# Patient Record
Sex: MALE | Race: WHITE | Employment: FULL TIME | URBAN - METROPOLITAN AREA
[De-identification: names, ages, dates, MRNs, and addresses within clinical notes are randomized per-mention and may not be internally consistent; named-entity substitution may affect disease eponyms.]

---

## 2017-01-25 PROCEDURE — 84403 ASSAY OF TOTAL TESTOSTERONE: CPT | Performed by: UROLOGY

## 2018-08-10 ENCOUNTER — APPOINTMENT (OUTPATIENT)
Dept: LAB | Facility: HOSPITAL | Age: 58
End: 2018-08-10
Attending: UROLOGY
Payer: COMMERCIAL

## 2018-08-10 DIAGNOSIS — Z98.52 S/P VASECTOMY: ICD-10-CM

## 2018-08-10 PROCEDURE — 89310 SEMEN ANALYSIS W/COUNT: CPT

## 2021-03-20 ENCOUNTER — IMMUNIZATION (OUTPATIENT)
Dept: LAB | Age: 61
End: 2021-03-20
Attending: HOSPITALIST
Payer: COMMERCIAL

## 2021-03-20 DIAGNOSIS — Z23 NEED FOR VACCINATION: Primary | ICD-10-CM

## 2021-03-20 PROCEDURE — 0001A SARSCOV2 VAC 30MCG/0.3ML IM: CPT

## 2021-04-10 ENCOUNTER — IMMUNIZATION (OUTPATIENT)
Dept: LAB | Age: 61
End: 2021-04-10
Attending: HOSPITALIST
Payer: COMMERCIAL

## 2021-04-10 DIAGNOSIS — Z23 NEED FOR VACCINATION: Primary | ICD-10-CM

## 2021-04-10 PROCEDURE — 0002A SARSCOV2 VAC 30MCG/0.3ML IM: CPT

## 2022-02-14 PROBLEM — M75.101 ROTATOR CUFF SYNDROME OF RIGHT SHOULDER: Status: ACTIVE | Noted: 2022-02-14

## 2022-02-14 PROBLEM — M19.011 LOCALIZED OSTEOARTHRITIS OF RIGHT SHOULDER: Status: ACTIVE | Noted: 2022-02-14

## 2022-12-17 ENCOUNTER — TELEPHONE (OUTPATIENT)
Dept: ORTHOPEDICS CLINIC | Facility: CLINIC | Age: 62
End: 2022-12-17

## 2022-12-17 DIAGNOSIS — M54.50 LOW BACK PAIN, UNSPECIFIED BACK PAIN LATERALITY, UNSPECIFIED CHRONICITY, UNSPECIFIED WHETHER SCIATICA PRESENT: Primary | ICD-10-CM

## 2022-12-19 ENCOUNTER — PATIENT MESSAGE (OUTPATIENT)
Dept: ORTHOPEDICS CLINIC | Facility: CLINIC | Age: 62
End: 2022-12-19

## 2022-12-19 NOTE — TELEPHONE ENCOUNTER
Future Appointments   Date Time Provider Janis Jennifer   12/23/2022  7:00 AM Maribel Clarke MD [facility]   12/23/2022 10:00 AM [facility] XR RM1 [facility] [place]       Patient is scheduled for appt and LM to complete prior to appt.

## 2023-09-16 ENCOUNTER — HOSPITAL ENCOUNTER (EMERGENCY)
Facility: HOSPITAL | Age: 63
Discharge: HOME | End: 2023-09-16
Attending: EMERGENCY MEDICINE
Payer: COMMERCIAL

## 2023-09-16 ENCOUNTER — APPOINTMENT (OUTPATIENT)
Dept: CT IMAGING | Facility: HOSPITAL | Age: 63
End: 2023-09-16
Attending: EMERGENCY MEDICINE
Payer: COMMERCIAL

## 2023-09-16 VITALS
HEIGHT: 73 IN | DIASTOLIC BLOOD PRESSURE: 100 MMHG | OXYGEN SATURATION: 96 % | WEIGHT: 220 LBS | TEMPERATURE: 98 F | HEART RATE: 78 BPM | BODY MASS INDEX: 29.16 KG/M2 | SYSTOLIC BLOOD PRESSURE: 170 MMHG | RESPIRATION RATE: 16 BRPM

## 2023-09-16 DIAGNOSIS — N23 RENAL COLIC: Primary | ICD-10-CM

## 2023-09-16 LAB
ALBUMIN SERPL-MCNC: 4.2 G/DL (ref 3.4–5)
ALBUMIN/GLOB SERPL: 1 {RATIO} (ref 1–2)
ALP LIVER SERPL-CCNC: 65 U/L
ALT SERPL-CCNC: 43 U/L
ANION GAP SERPL CALC-SCNC: 6 MMOL/L (ref 0–18)
AST SERPL-CCNC: 17 U/L (ref 15–37)
BASOPHILS # BLD AUTO: 0.01 X10(3) UL (ref 0–0.2)
BASOPHILS NFR BLD AUTO: 0.1 %
BILIRUB SERPL-MCNC: 0.9 MG/DL (ref 0.1–2)
BILIRUB UR QL STRIP.AUTO: NEGATIVE
BUN BLD-MCNC: 15 MG/DL (ref 7–18)
CALCIUM BLD-MCNC: 9.9 MG/DL (ref 8.5–10.1)
CHLORIDE SERPL-SCNC: 102 MMOL/L (ref 98–112)
CLARITY UR REFRACT.AUTO: CLEAR
CO2 SERPL-SCNC: 28 MMOL/L (ref 21–32)
COLOR UR AUTO: COLORLESS
CREAT BLD-MCNC: 1.56 MG/DL
EGFRCR SERPLBLD CKD-EPI 2021: 50 ML/MIN/1.73M2 (ref 60–?)
EOSINOPHIL # BLD AUTO: 0.01 X10(3) UL (ref 0–0.7)
EOSINOPHIL NFR BLD AUTO: 0.1 %
ERYTHROCYTE [DISTWIDTH] IN BLOOD BY AUTOMATED COUNT: 13.1 %
GLOBULIN PLAS-MCNC: 4.3 G/DL (ref 2.8–4.4)
GLUCOSE BLD-MCNC: 129 MG/DL (ref 70–99)
GLUCOSE UR STRIP.AUTO-MCNC: NORMAL MG/DL
HCT VFR BLD AUTO: 49 %
HGB BLD-MCNC: 16.4 G/DL
HYALINE CASTS #/AREA URNS AUTO: PRESENT /LPF
IMM GRANULOCYTES # BLD AUTO: 0.03 X10(3) UL (ref 0–1)
IMM GRANULOCYTES NFR BLD: 0.3 %
LEUKOCYTE ESTERASE UR QL STRIP.AUTO: NEGATIVE
LIPASE SERPL-CCNC: 24 U/L (ref 13–75)
LYMPHOCYTES # BLD AUTO: 0.6 X10(3) UL (ref 1–4)
LYMPHOCYTES NFR BLD AUTO: 6 %
MCH RBC QN AUTO: 29 PG (ref 26–34)
MCHC RBC AUTO-ENTMCNC: 33.5 G/DL (ref 31–37)
MCV RBC AUTO: 86.7 FL
MONOCYTES # BLD AUTO: 0.44 X10(3) UL (ref 0.1–1)
MONOCYTES NFR BLD AUTO: 4.4 %
NEUTROPHILS # BLD AUTO: 8.91 X10 (3) UL (ref 1.5–7.7)
NEUTROPHILS # BLD AUTO: 8.91 X10(3) UL (ref 1.5–7.7)
NEUTROPHILS NFR BLD AUTO: 89.1 %
NITRITE UR QL STRIP.AUTO: NEGATIVE
OSMOLALITY SERPL CALC.SUM OF ELEC: 285 MOSM/KG (ref 275–295)
PH UR STRIP.AUTO: 6.5 [PH] (ref 5–8)
PLATELET # BLD AUTO: 236 10(3)UL (ref 150–450)
POTASSIUM SERPL-SCNC: 4.1 MMOL/L (ref 3.5–5.1)
PROT SERPL-MCNC: 8.5 G/DL (ref 6.4–8.2)
PROT UR STRIP.AUTO-MCNC: 20 MG/DL
RBC # BLD AUTO: 5.65 X10(6)UL
SODIUM SERPL-SCNC: 136 MMOL/L (ref 136–145)
SP GR UR STRIP.AUTO: 1.01 (ref 1–1.03)
UROBILINOGEN UR STRIP.AUTO-MCNC: NORMAL MG/DL
WBC # BLD AUTO: 10 X10(3) UL (ref 4–11)

## 2023-09-16 PROCEDURE — 99284 EMERGENCY DEPT VISIT MOD MDM: CPT

## 2023-09-16 PROCEDURE — 96375 TX/PRO/DX INJ NEW DRUG ADDON: CPT

## 2023-09-16 PROCEDURE — 96361 HYDRATE IV INFUSION ADD-ON: CPT

## 2023-09-16 PROCEDURE — 80053 COMPREHEN METABOLIC PANEL: CPT

## 2023-09-16 PROCEDURE — 74177 CT ABD & PELVIS W/CONTRAST: CPT | Performed by: EMERGENCY MEDICINE

## 2023-09-16 PROCEDURE — 81001 URINALYSIS AUTO W/SCOPE: CPT | Performed by: EMERGENCY MEDICINE

## 2023-09-16 PROCEDURE — 83690 ASSAY OF LIPASE: CPT | Performed by: EMERGENCY MEDICINE

## 2023-09-16 PROCEDURE — 85025 COMPLETE CBC W/AUTO DIFF WBC: CPT | Performed by: EMERGENCY MEDICINE

## 2023-09-16 PROCEDURE — 85025 COMPLETE CBC W/AUTO DIFF WBC: CPT

## 2023-09-16 PROCEDURE — 80053 COMPREHEN METABOLIC PANEL: CPT | Performed by: EMERGENCY MEDICINE

## 2023-09-16 PROCEDURE — 83690 ASSAY OF LIPASE: CPT

## 2023-09-16 PROCEDURE — 96374 THER/PROPH/DIAG INJ IV PUSH: CPT

## 2023-09-16 RX ORDER — HYDROMORPHONE HYDROCHLORIDE 1 MG/ML
0.5 INJECTION, SOLUTION INTRAMUSCULAR; INTRAVENOUS; SUBCUTANEOUS EVERY 30 MIN PRN
Status: DISCONTINUED | OUTPATIENT
Start: 2023-09-16 | End: 2023-09-16

## 2023-09-16 RX ORDER — ONDANSETRON 4 MG/1
4 TABLET, ORALLY DISINTEGRATING ORAL EVERY 4 HOURS PRN
Qty: 10 TABLET | Refills: 0 | Status: SHIPPED | OUTPATIENT
Start: 2023-09-16 | End: 2023-09-23

## 2023-09-16 RX ORDER — HYDROCODONE BITARTRATE AND ACETAMINOPHEN 5; 325 MG/1; MG/1
1 TABLET ORAL EVERY 6 HOURS PRN
Qty: 10 TABLET | Refills: 0 | Status: SHIPPED | OUTPATIENT
Start: 2023-09-16

## 2023-09-16 RX ORDER — KETOROLAC TROMETHAMINE 15 MG/ML
15 INJECTION, SOLUTION INTRAMUSCULAR; INTRAVENOUS ONCE
Status: COMPLETED | OUTPATIENT
Start: 2023-09-16 | End: 2023-09-16

## 2023-09-16 RX ORDER — SODIUM CHLORIDE 9 MG/ML
125 INJECTION, SOLUTION INTRAVENOUS CONTINUOUS
Status: DISCONTINUED | OUTPATIENT
Start: 2023-09-16 | End: 2023-09-16

## 2023-09-16 NOTE — ED INITIAL ASSESSMENT (HPI)
Pt arrives ambulatory to triage, states he woke up this morning with LLQ abdominal pain. +vomiting. Denies abdominal surgeries.  A&O x4

## 2023-09-16 NOTE — DISCHARGE INSTRUCTIONS
Return for intractable pain, fever or vomiting  Call urology office on Monday for outpatient follow-up  Ibuprofen 400 mg 3 times a day with food for pain  Norco for severe pain  Take Colace stool softener upon Norco to prevent constipation  Zofran for nausea  Drink plenty of fluids  Strain all your urine and evaluate for stone  Drink lemon water daily to prevent formation of stones in the future      Your blood pressure is elevated here in the ED. You need to follow-up with your primary care to be closely monitored for hypertension.

## 2023-09-18 ENCOUNTER — TELEPHONE (OUTPATIENT)
Dept: SURGERY | Facility: CLINIC | Age: 63
End: 2023-09-18

## 2023-09-19 NOTE — TELEPHONE ENCOUNTER
I called pt back and LM to disregard last message, I see pt already has urologist with [facility] and has upcoming keenan with them on 10/4/23

## 2023-12-26 ENCOUNTER — OFFICE VISIT (OUTPATIENT)
Dept: INTERNAL MEDICINE CLINIC | Facility: CLINIC | Age: 63
End: 2023-12-26
Payer: COMMERCIAL

## 2023-12-26 VITALS
TEMPERATURE: 98 F | BODY MASS INDEX: 29.74 KG/M2 | WEIGHT: 224.38 LBS | HEIGHT: 73 IN | OXYGEN SATURATION: 99 % | HEART RATE: 67 BPM | SYSTOLIC BLOOD PRESSURE: 148 MMHG | DIASTOLIC BLOOD PRESSURE: 92 MMHG

## 2023-12-26 DIAGNOSIS — Z13.0 SCREENING FOR DEFICIENCY ANEMIA: ICD-10-CM

## 2023-12-26 DIAGNOSIS — Z13.29 THYROID DISORDER SCREEN: ICD-10-CM

## 2023-12-26 DIAGNOSIS — Z12.11 SCREENING FOR COLON CANCER: Primary | ICD-10-CM

## 2023-12-26 DIAGNOSIS — R03.0 ELEVATED BLOOD PRESSURE READING: ICD-10-CM

## 2023-12-26 DIAGNOSIS — Z13.220 LIPID SCREENING: ICD-10-CM

## 2023-12-26 DIAGNOSIS — Z87.442 HISTORY OF KIDNEY STONES: ICD-10-CM

## 2023-12-26 DIAGNOSIS — N52.9 ERECTILE DYSFUNCTION, UNSPECIFIED ERECTILE DYSFUNCTION TYPE: ICD-10-CM

## 2023-12-26 PROCEDURE — 99203 OFFICE O/P NEW LOW 30 MIN: CPT | Performed by: FAMILY MEDICINE

## 2023-12-26 PROCEDURE — 3080F DIAST BP >= 90 MM HG: CPT | Performed by: FAMILY MEDICINE

## 2023-12-26 PROCEDURE — 3077F SYST BP >= 140 MM HG: CPT | Performed by: FAMILY MEDICINE

## 2023-12-26 PROCEDURE — 90715 TDAP VACCINE 7 YRS/> IM: CPT | Performed by: FAMILY MEDICINE

## 2023-12-26 PROCEDURE — 90471 IMMUNIZATION ADMIN: CPT | Performed by: FAMILY MEDICINE

## 2023-12-26 PROCEDURE — 3008F BODY MASS INDEX DOCD: CPT | Performed by: FAMILY MEDICINE

## 2023-12-28 ENCOUNTER — LAB ENCOUNTER (OUTPATIENT)
Dept: LAB | Age: 63
End: 2023-12-28
Attending: FAMILY MEDICINE
Payer: COMMERCIAL

## 2023-12-28 DIAGNOSIS — R03.0 ELEVATED BLOOD PRESSURE READING: ICD-10-CM

## 2023-12-28 DIAGNOSIS — Z13.220 LIPID SCREENING: ICD-10-CM

## 2023-12-28 DIAGNOSIS — Z13.0 SCREENING FOR DEFICIENCY ANEMIA: ICD-10-CM

## 2023-12-28 DIAGNOSIS — Z13.29 THYROID DISORDER SCREEN: ICD-10-CM

## 2023-12-28 LAB
ALBUMIN SERPL-MCNC: 3.8 G/DL (ref 3.4–5)
ALBUMIN/GLOB SERPL: 1.1 {RATIO} (ref 1–2)
ALP LIVER SERPL-CCNC: 50 U/L
ALT SERPL-CCNC: 22 U/L
ANION GAP SERPL CALC-SCNC: 3 MMOL/L (ref 0–18)
AST SERPL-CCNC: 15 U/L (ref 15–37)
BASOPHILS # BLD AUTO: 0.02 X10(3) UL (ref 0–0.2)
BASOPHILS NFR BLD AUTO: 0.4 %
BILIRUB SERPL-MCNC: 0.7 MG/DL (ref 0.1–2)
BUN BLD-MCNC: 15 MG/DL (ref 9–23)
CALCIUM BLD-MCNC: 9 MG/DL (ref 8.5–10.1)
CHLORIDE SERPL-SCNC: 106 MMOL/L (ref 98–112)
CHOLEST SERPL-MCNC: 256 MG/DL (ref ?–200)
CO2 SERPL-SCNC: 32 MMOL/L (ref 21–32)
CREAT BLD-MCNC: 1.18 MG/DL
EGFRCR SERPLBLD CKD-EPI 2021: 70 ML/MIN/1.73M2 (ref 60–?)
EOSINOPHIL # BLD AUTO: 0.28 X10(3) UL (ref 0–0.7)
EOSINOPHIL NFR BLD AUTO: 5.1 %
ERYTHROCYTE [DISTWIDTH] IN BLOOD BY AUTOMATED COUNT: 13.7 %
FASTING PATIENT LIPID ANSWER: YES
FASTING STATUS PATIENT QL REPORTED: YES
GLOBULIN PLAS-MCNC: 3.4 G/DL (ref 2.8–4.4)
GLUCOSE BLD-MCNC: 100 MG/DL (ref 70–99)
HCT VFR BLD AUTO: 47.6 %
HDLC SERPL-MCNC: 58 MG/DL (ref 40–59)
HGB BLD-MCNC: 15.2 G/DL
IMM GRANULOCYTES # BLD AUTO: 0.02 X10(3) UL (ref 0–1)
IMM GRANULOCYTES NFR BLD: 0.4 %
LDLC SERPL CALC-MCNC: 176 MG/DL (ref ?–100)
LYMPHOCYTES # BLD AUTO: 1.57 X10(3) UL (ref 1–4)
LYMPHOCYTES NFR BLD AUTO: 28.3 %
MCH RBC QN AUTO: 29.2 PG (ref 26–34)
MCHC RBC AUTO-ENTMCNC: 31.9 G/DL (ref 31–37)
MCV RBC AUTO: 91.4 FL
MONOCYTES # BLD AUTO: 0.56 X10(3) UL (ref 0.1–1)
MONOCYTES NFR BLD AUTO: 10.1 %
NEUTROPHILS # BLD AUTO: 3.09 X10 (3) UL (ref 1.5–7.7)
NEUTROPHILS # BLD AUTO: 3.09 X10(3) UL (ref 1.5–7.7)
NEUTROPHILS NFR BLD AUTO: 55.7 %
NONHDLC SERPL-MCNC: 198 MG/DL (ref ?–130)
OSMOLALITY SERPL CALC.SUM OF ELEC: 293 MOSM/KG (ref 275–295)
PLATELET # BLD AUTO: 229 10(3)UL (ref 150–450)
POTASSIUM SERPL-SCNC: 3.9 MMOL/L (ref 3.5–5.1)
PROT SERPL-MCNC: 7.2 G/DL (ref 6.4–8.2)
RBC # BLD AUTO: 5.21 X10(6)UL
SODIUM SERPL-SCNC: 141 MMOL/L (ref 136–145)
T4 FREE SERPL-MCNC: 1 NG/DL (ref 0.8–1.7)
TRIGL SERPL-MCNC: 124 MG/DL (ref 30–149)
TSI SER-ACNC: 4.46 MIU/ML (ref 0.36–3.74)
VLDLC SERPL CALC-MCNC: 25 MG/DL (ref 0–30)
WBC # BLD AUTO: 5.5 X10(3) UL (ref 4–11)

## 2023-12-28 PROCEDURE — 85025 COMPLETE CBC W/AUTO DIFF WBC: CPT

## 2023-12-28 PROCEDURE — 84443 ASSAY THYROID STIM HORMONE: CPT

## 2023-12-28 PROCEDURE — 80061 LIPID PANEL: CPT

## 2023-12-28 PROCEDURE — 36415 COLL VENOUS BLD VENIPUNCTURE: CPT

## 2023-12-28 PROCEDURE — 80053 COMPREHEN METABOLIC PANEL: CPT

## 2023-12-28 PROCEDURE — 84439 ASSAY OF FREE THYROXINE: CPT

## 2024-01-29 ENCOUNTER — OFFICE VISIT (OUTPATIENT)
Dept: INTERNAL MEDICINE CLINIC | Facility: CLINIC | Age: 64
End: 2024-01-29
Payer: COMMERCIAL

## 2024-01-29 VITALS
DIASTOLIC BLOOD PRESSURE: 84 MMHG | RESPIRATION RATE: 16 BRPM | SYSTOLIC BLOOD PRESSURE: 140 MMHG | WEIGHT: 224.63 LBS | OXYGEN SATURATION: 99 % | HEIGHT: 73 IN | HEART RATE: 70 BPM | BODY MASS INDEX: 29.77 KG/M2

## 2024-01-29 DIAGNOSIS — H91.93 BILATERAL HEARING LOSS, UNSPECIFIED HEARING LOSS TYPE: ICD-10-CM

## 2024-01-29 DIAGNOSIS — E78.00 HYPERCHOLESTEREMIA: ICD-10-CM

## 2024-01-29 DIAGNOSIS — Z23 NEED FOR SHINGLES VACCINE: ICD-10-CM

## 2024-01-29 DIAGNOSIS — I10 ESSENTIAL HYPERTENSION: ICD-10-CM

## 2024-01-29 DIAGNOSIS — Z12.11 SCREENING FOR COLON CANCER: ICD-10-CM

## 2024-01-29 DIAGNOSIS — Z00.00 WELLNESS EXAMINATION: Primary | ICD-10-CM

## 2024-01-29 RX ORDER — AMLODIPINE BESYLATE 5 MG/1
5 TABLET ORAL DAILY
Qty: 90 TABLET | Refills: 0 | Status: SHIPPED | OUTPATIENT
Start: 2024-01-29

## 2024-01-30 NOTE — PROGRESS NOTES
Zachariah Rivera  9/18/1960    Chief Complaint   Patient presents with    Physical     NT RM 8 Physical       HPI:   Zachariah Rivera is a 63 year old male who presents for CPE. His BP has been elevated at home in the 140-150/80-90. He has his colonoscopy scheduled for April. Has been trying to be consistent with exercise and is also tryin to maintain a healthy diet.     Current Outpatient Medications   Medication Sig Dispense Refill    amLODIPine 5 MG Oral Tab Take 1 tablet (5 mg total) by mouth daily. 90 tablet 0    PEG 3350-KCl-Na Bicarb-NaCl 420 g Oral Recon Soln Take as directed by physician 4000 mL 0    Sildenafil Citrate 100 MG Oral Tab Take 1 tablet (100 mg total) by mouth daily as needed for Erectile Dysfunction. 30 tablet 3    Fluticasone Propionate (FLONASE) 50 MCG/ACT Nasal Suspension 2 sprays by Each Nare route daily. 1 Bottle 0    Multiple Vitamins-Minerals (MULTIVITAMIN OR) Take  by mouth.        Allergies   Allergen Reactions    Pcn [Penicillins]       Past Medical History:   Diagnosis Date    Sarcoidosis     Sarcoidosis       Patient Active Problem List   Diagnosis    Change in bowel habits    Screening for colon cancer    Sarcoidosis    Chronic headaches    Rotator cuff syndrome of right shoulder    Localized osteoarthritis of right shoulder      Past Surgical History:   Procedure Laterality Date    TONSILLECTOMY        Family History   Problem Relation Age of Onset    Cancer Father         Lung    Lipids Mother     Heart Disorder Paternal Grandmother       Social History     Socioeconomic History    Marital status:    Tobacco Use    Smoking status: Never    Smokeless tobacco: Never   Vaping Use    Vaping Use: Never used   Substance and Sexual Activity    Alcohol use: Yes     Comment: Occasional    Drug use: No         REVIEW OF SYSTEMS:   GENERAL: feels well otherwise  SKIN: no rash  EYES: no new vision changes  HEENT: not congested  LUNGS: denies shortness of breath with  exertion  CARDIOVASCULAR: denies chest pain on exertion    EXAM:   /84   Pulse 70   Resp 16   Ht 6' 1\" (1.854 m)   Wt 224 lb 9.6 oz (101.9 kg)   SpO2 99%   BMI 29.63 kg/m²   GENERAL: Well developed, well nourished,in no apparent distress  SKIN: No rashes,no suspicious lesions  EYES: PERRLA, EOMI, conjunctiva are clear  HEENT: atraumatic, normocephalic,ears and throat are clear  NECK: supple,no adenopathy,no bruits  LUNGS: clear to auscultation  CARDIO: RRR without murmur  GI: good BS's,no masses, HSM or tenderness    ASSESSMENT AND PLAN:   Zachariah Rivera is a 63 year old male who presents for CPE    Wellness examination  Discussed age appropriate health and wellness.     Essential hypertension  Will begin amlodipine and continue emphasis on healthy diet and exercise. DASH diet performed. Continue home monitoring and follow-up in 8 weeks with cuff and log.   - amLODIPine 5 MG Oral Tab; Take 1 tablet (5 mg total) by mouth daily.  Dispense: 90 tablet; Refill: 0  - CT CALCIUM SCORING; Future    Hypercholesteremia  Will further evaluate with [facility]. Continue healthy diet and exercise. DASH diet provided.   - CT CALCIUM SCORING; Future    Bilateral hearing loss, unspecified hearing loss type  Continue hearing aides.     Screening for colon cancer  Colonoscopy scheduled for April     Need for shingles vaccine  - ZOSTER VACC RECOMBINANT (Shigrix-Shingles)    Return in 2 months (on 3/29/2024).  There are no Patient Instructions on file for this visit.    All questions were answered and the patient agrees with the plan.     Thank you,  Drew Horne MD,

## 2024-02-26 ENCOUNTER — HOSPITAL ENCOUNTER (OUTPATIENT)
Dept: CT IMAGING | Facility: HOSPITAL | Age: 64
End: 2024-02-26
Attending: FAMILY MEDICINE

## 2024-02-26 DIAGNOSIS — Z13.6 SCREENING FOR CARDIOVASCULAR CONDITION: ICD-10-CM

## 2024-04-01 ENCOUNTER — OFFICE VISIT (OUTPATIENT)
Dept: INTERNAL MEDICINE CLINIC | Facility: CLINIC | Age: 64
End: 2024-04-01
Payer: COMMERCIAL

## 2024-04-01 VITALS
HEIGHT: 73.62 IN | OXYGEN SATURATION: 98 % | RESPIRATION RATE: 18 BRPM | BODY MASS INDEX: 29.53 KG/M2 | TEMPERATURE: 98 F | DIASTOLIC BLOOD PRESSURE: 84 MMHG | WEIGHT: 227.63 LBS | HEART RATE: 76 BPM | SYSTOLIC BLOOD PRESSURE: 132 MMHG

## 2024-04-01 DIAGNOSIS — E78.00 HYPERCHOLESTEREMIA: ICD-10-CM

## 2024-04-01 DIAGNOSIS — Z12.11 SCREENING FOR COLON CANCER: ICD-10-CM

## 2024-04-01 DIAGNOSIS — Z23 NEED FOR SHINGLES VACCINE: ICD-10-CM

## 2024-04-01 DIAGNOSIS — I10 ESSENTIAL HYPERTENSION: Primary | ICD-10-CM

## 2024-04-01 PROCEDURE — 99214 OFFICE O/P EST MOD 30 MIN: CPT | Performed by: FAMILY MEDICINE

## 2024-04-01 PROCEDURE — 90471 IMMUNIZATION ADMIN: CPT | Performed by: FAMILY MEDICINE

## 2024-04-01 PROCEDURE — 90750 HZV VACC RECOMBINANT IM: CPT | Performed by: FAMILY MEDICINE

## 2024-04-01 NOTE — PROGRESS NOTES
Zachariah Rivera  9/18/1960    Chief Complaint   Patient presents with    Follow - Up     ES rm - 8 f/u for BP and 2nd Shingrex       HPI:   Zachariah Rivera is a 63 year old male who presents for follow-up.  He has been tolerating his amlodipine well.  His home blood pressure readings have improved and generally at goal.  He did complete his heart scan and his calcium score is low at 9. He is trying to maintain a healthy diet and hoping to increase his exercise with more walking.     Current Outpatient Medications   Medication Sig Dispense Refill    amLODIPine 5 MG Oral Tab Take 1 tablet (5 mg total) by mouth daily. 90 tablet 0    PEG 3350-KCl-Na Bicarb-NaCl 420 g Oral Recon Soln Take as directed by physician 4000 mL 0    Sildenafil Citrate 100 MG Oral Tab Take 1 tablet (100 mg total) by mouth daily as needed for Erectile Dysfunction. 30 tablet 3    Fluticasone Propionate (FLONASE) 50 MCG/ACT Nasal Suspension 2 sprays by Each Nare route daily. 1 Bottle 0    Multiple Vitamins-Minerals (MULTIVITAMIN OR) Take  by mouth.        Allergies   Allergen Reactions    Pcn [Penicillins]       Past Medical History:   Diagnosis Date    Calculus of kidney 09/15/2023    Enlarged lymph node 2004    Hearing loss     High cholesterol     Sarcoidosis     Sarcoidosis     Wears glasses 2022      Patient Active Problem List   Diagnosis    Change in bowel habits    Screening for colon cancer    Sarcoidosis    Chronic headaches    Rotator cuff syndrome of right shoulder    Localized osteoarthritis of right shoulder    Bilateral hearing loss      Past Surgical History:   Procedure Laterality Date    COLONOSCOPY      TONSILLECTOMY        Family History   Problem Relation Age of Onset    Cancer Father         Lung    Lipids Mother     Heart Disorder Paternal Grandmother       Social History     Socioeconomic History    Marital status:    Tobacco Use    Smoking status: Never    Smokeless tobacco: Never   Vaping Use     Vaping Use: Never used   Substance and Sexual Activity    Alcohol use: Yes     Alcohol/week: 1.0 standard drink of alcohol     Types: 1 Cans of beer per week     Comment: Occasional    Drug use: No         REVIEW OF SYSTEMS:   GENERAL: feels well otherwise, no new CP or SOB    EXAM:   /84 (BP Location: Left arm, Patient Position: Sitting, Cuff Size: large)   Pulse 76   Temp 98.4 °F (36.9 °C) (Temporal)   Resp 18   Ht 6' 1.62\" (1.87 m)   Wt 227 lb 9.6 oz (103.2 kg)   SpO2 98%   BMI 29.52 kg/m²   GENERAL: Well developed, well nourished,in no apparent distress  SKIN: No rash  EYES: PERRLA, EOMI, conjunctiva are clear  HEENT: atraumatic, normocephalic  LUNGS: clear to auscultation  CARDIO: RRR without murmur    ASSESSMENT AND PLAN:   Zachariah Rivera is a 63 year old male who presents for follow-up    Essential hypertension  His blood pressure is improved since starting amlodipine.  His home blood pressure readings are generally at goal and his blood pressure in office has improved.  He will continue his amlodipine and will continue to work on lifestyle optimization.  Follow-up in 3 months.  - Comp Metabolic Panel (14); Future    Hypercholesteremia  Ultrafast heart scan shows calcium score of 9.  He is working to improve his diet and exercise.  We will repeat his lipid panel in 3 months prior to his follow-up.  - Lipid Panel; Future  - Comp Metabolic Panel (14); Future    Need for shingles vaccine  - Zoster Vac (Shingrix) in office vaccine- Non-Medicare or Medicare with ABN    Screening for colon cancer  Colonoscopy scheduled in 2 weeks      All questions were answered and the patient agrees with the plan.     Thank you,  Drew Horne MD

## 2024-04-15 PROBLEM — Z12.11 SPECIAL SCREENING FOR MALIGNANT NEOPLASM OF COLON: Status: ACTIVE | Noted: 2024-04-15

## 2024-04-25 DIAGNOSIS — I10 ESSENTIAL HYPERTENSION: ICD-10-CM

## 2024-04-26 RX ORDER — AMLODIPINE BESYLATE 5 MG/1
5 TABLET ORAL DAILY
Qty: 90 TABLET | Refills: 0 | Status: SHIPPED | OUTPATIENT
Start: 2024-04-26

## 2024-06-25 ENCOUNTER — LAB ENCOUNTER (OUTPATIENT)
Dept: LAB | Age: 64
End: 2024-06-25
Attending: FAMILY MEDICINE

## 2024-06-25 DIAGNOSIS — I10 ESSENTIAL HYPERTENSION: ICD-10-CM

## 2024-06-25 DIAGNOSIS — E78.00 HYPERCHOLESTEREMIA: ICD-10-CM

## 2024-06-25 LAB
ALBUMIN SERPL-MCNC: 3.9 G/DL (ref 3.4–5)
ALBUMIN/GLOB SERPL: 1.1 {RATIO} (ref 1–2)
ALP LIVER SERPL-CCNC: 55 U/L
ALT SERPL-CCNC: 22 U/L
ANION GAP SERPL CALC-SCNC: 6 MMOL/L (ref 0–18)
AST SERPL-CCNC: 12 U/L (ref 15–37)
BILIRUB SERPL-MCNC: 0.8 MG/DL (ref 0.1–2)
BUN BLD-MCNC: 16 MG/DL (ref 9–23)
CALCIUM BLD-MCNC: 9 MG/DL (ref 8.5–10.1)
CHLORIDE SERPL-SCNC: 106 MMOL/L (ref 98–112)
CHOLEST SERPL-MCNC: 251 MG/DL (ref ?–200)
CO2 SERPL-SCNC: 27 MMOL/L (ref 21–32)
CREAT BLD-MCNC: 1.11 MG/DL
EGFRCR SERPLBLD CKD-EPI 2021: 75 ML/MIN/1.73M2 (ref 60–?)
FASTING PATIENT LIPID ANSWER: YES
FASTING STATUS PATIENT QL REPORTED: YES
GLOBULIN PLAS-MCNC: 3.5 G/DL (ref 2.8–4.4)
GLUCOSE BLD-MCNC: 90 MG/DL (ref 70–99)
HDLC SERPL-MCNC: 56 MG/DL (ref 40–59)
LDLC SERPL CALC-MCNC: 175 MG/DL (ref ?–100)
NONHDLC SERPL-MCNC: 195 MG/DL (ref ?–130)
OSMOLALITY SERPL CALC.SUM OF ELEC: 289 MOSM/KG (ref 275–295)
POTASSIUM SERPL-SCNC: 4 MMOL/L (ref 3.5–5.1)
PROT SERPL-MCNC: 7.4 G/DL (ref 6.4–8.2)
SODIUM SERPL-SCNC: 139 MMOL/L (ref 136–145)
TRIGL SERPL-MCNC: 114 MG/DL (ref 30–149)
VLDLC SERPL CALC-MCNC: 23 MG/DL (ref 0–30)

## 2024-06-25 PROCEDURE — 80061 LIPID PANEL: CPT

## 2024-06-25 PROCEDURE — 36415 COLL VENOUS BLD VENIPUNCTURE: CPT

## 2024-06-25 PROCEDURE — 80053 COMPREHEN METABOLIC PANEL: CPT

## 2024-07-01 ENCOUNTER — OFFICE VISIT (OUTPATIENT)
Dept: INTERNAL MEDICINE CLINIC | Facility: CLINIC | Age: 64
End: 2024-07-01
Payer: COMMERCIAL

## 2024-07-01 VITALS
OXYGEN SATURATION: 99 % | TEMPERATURE: 98 F | DIASTOLIC BLOOD PRESSURE: 84 MMHG | HEIGHT: 73.23 IN | WEIGHT: 222.19 LBS | HEART RATE: 70 BPM | BODY MASS INDEX: 29.13 KG/M2 | RESPIRATION RATE: 16 BRPM | SYSTOLIC BLOOD PRESSURE: 138 MMHG

## 2024-07-01 DIAGNOSIS — I10 ESSENTIAL HYPERTENSION: Primary | ICD-10-CM

## 2024-07-01 DIAGNOSIS — E78.00 HYPERCHOLESTEREMIA: ICD-10-CM

## 2024-07-01 PROCEDURE — 99214 OFFICE O/P EST MOD 30 MIN: CPT | Performed by: FAMILY MEDICINE

## 2024-07-01 RX ORDER — ATORVASTATIN CALCIUM 20 MG/1
20 TABLET, FILM COATED ORAL NIGHTLY
Qty: 90 TABLET | Refills: 1 | Status: SHIPPED | OUTPATIENT
Start: 2024-07-01

## 2024-07-01 NOTE — PROGRESS NOTES
Zachariah Rivera  9/18/1960    Chief Complaint   Patient presents with    Follow - Up     ES rm - 8 -  f/u for BP and labs       HPI:   Zachariah Rivera is a 63 year old male who presents for follow-up. Overall been doing well. BP at home has been well controlled. Has been continuing to walk regularly for exercise and maintain a healthy diet.     Current Outpatient Medications   Medication Sig Dispense Refill    atorvastatin 20 MG Oral Tab Take 1 tablet (20 mg total) by mouth nightly. 90 tablet 1    AMLODIPINE 5 MG Oral Tab TAKE 1 TABLET(5 MG) BY MOUTH DAILY 90 tablet 0    Sildenafil Citrate 100 MG Oral Tab Take 1 tablet (100 mg total) by mouth daily as needed for Erectile Dysfunction. 30 tablet 3    Fluticasone Propionate (FLONASE) 50 MCG/ACT Nasal Suspension 2 sprays by Each Nare route daily. 1 Bottle 0    Multiple Vitamins-Minerals (MULTIVITAMIN OR) Take  by mouth.        Allergies   Allergen Reactions    Pcn [Penicillins]       Past Medical History:    Calculus of kidney    Enlarged lymph node    Hearing loss    High cholesterol    Sarcoidosis    Sarcoidosis    Wears glasses      Patient Active Problem List   Diagnosis    Change in bowel habits    Screening for colon cancer    Sarcoidosis    Chronic headaches    Rotator cuff syndrome of right shoulder    Localized osteoarthritis of right shoulder    Bilateral hearing loss    Special screening for malignant neoplasm of colon      Past Surgical History:   Procedure Laterality Date    Colonoscopy      Tonsillectomy        Family History   Problem Relation Age of Onset    Cancer Father         Lung    Lipids Mother     Heart Disorder Paternal Grandmother       Social History     Socioeconomic History    Marital status:    Tobacco Use    Smoking status: Never    Smokeless tobacco: Never   Vaping Use    Vaping status: Never Used   Substance and Sexual Activity    Alcohol use: Yes     Alcohol/week: 1.0 standard drink of alcohol     Types: 1 Cans of  beer per week     Comment: Occasional    Drug use: No         REVIEW OF SYSTEMS:   GENERAL: feels well otherwise, no recent illness.     EXAM:   /84   Pulse 70   Temp 97.8 °F (36.6 °C) (Temporal)   Resp 16   Ht 6' 1.23\" (1.86 m)   Wt 222 lb 3.2 oz (100.8 kg)   SpO2 99%   BMI 29.13 kg/m²   GENERAL: Well developed, well nourished,in no apparent distress  SKIN: No rash  EYES: PERRLA, EOMI, conjunctiva are clear  HEENT: atraumatic, normocephalic  LUNGS: clear to auscultation  CARDIO: Regular rate    ASSESSMENT AND PLAN:   Zachariah Rivera is a 63 year old male who presents for follow-up    Essential hypertension  Repeat BP improved in office. Well controlled on home readings. Will continue current treatment and continue home monitoring.     Hypercholesteremia  10 yr ASCVD risk approx 15%. Calcium score minimally elevated at 9. Discussed indications for statin initiation. Will begin atorvastatin 20 mg nightly. Continue emphasis on lifestyle optimization. Repeat lipid panel and CMP in 3 months.   - atorvastatin 20 MG Oral Tab; Take 1 tablet (20 mg total) by mouth nightly.  Dispense: 90 tablet; Refill: 1  - Lipid Panel; Future  - Comp Metabolic Panel (14); Future      All questions were answered and the patient agrees with the plan.     Thank you,  Drew Horne MD

## 2024-07-23 DIAGNOSIS — I10 ESSENTIAL HYPERTENSION: ICD-10-CM

## 2024-07-25 RX ORDER — AMLODIPINE BESYLATE 5 MG/1
5 TABLET ORAL DAILY
Qty: 90 TABLET | Refills: 3 | Status: SHIPPED | OUTPATIENT
Start: 2024-07-25

## 2024-07-26 NOTE — TELEPHONE ENCOUNTER
Refill passed per [facility] protocol.    Requested Prescriptions   Pending Prescriptions Disp Refills    AMLODIPINE 5 MG Oral Tab [Pharmacy Med Name: AMLODIPINE BESYLATE 5MG TABLETS] 90 tablet 0     Sig: TAKE 1 TABLET(5 MG) BY MOUTH DAILY       Hypertension Medications Protocol Passed - 7/23/2024  7:54 PM        Passed - CMP or BMP in past 12 months        Passed - Last BP reading less than 140/90     BP Readings from Last 1 Encounters:   07/01/24 138/84               Passed - In person appointment or virtual visit in the past 12 mos or appointment in next 3 mos     Recent Outpatient Visits              3 weeks ago Essential hypertension    [facility] [address] [place], [place] [address]Tsering Michael, MD    Office Visit    3 months ago Essential hypertension    [facility] [address] [place], [place] [address]Tsering Michael, MD    Office Visit    5 months ago Wellness examination    [facility] [address] [place], [place] [address]Tsering Michael, MD    Office Visit    7 months ago Screening for colon cancer    [facility] [address] [place], [place] [address]Tsering Michael, MD    Office Visit    2 years ago Chronic right shoulder pain    [facility], Carlos Ribera MD    Office Visit          Future Appointments         Provider Department Appt Notes    In 1 week Drew Horne MD [address]                    Passed - EGFRCR or GFRNAA > 50     GFR Evaluation  EGFRCR: 75 , resulted on 6/25/2024             Future Appointments         Provider Department Appt Notes    In 1 week Drew Horne MD [address] Pain [address]          Recent Outpatient Visits              3 weeks ago Essential hypertension    [facility] [address] [place], [place] [address]Tsering Michael, MD    Office Visit    3 months ago Essential hypertension     [facility], [address] [place], [place] [address]Tsering Michael, MD    Office Visit    5 months ago Wellness examination    [address]    Office Visit    7 months ago Screening for colon cancer    [facility], [address] [place], [place] [address]Tsering Michael, MD    Office Visit    2 years ago Chronic right shoulder pain    [facility], Carlos Ribera MD    Office Visit

## 2024-08-04 ENCOUNTER — HOSPITAL ENCOUNTER (EMERGENCY)
Facility: HOSPITAL | Age: 64
Discharge: HOME | End: 2024-08-04
Attending: EMERGENCY MEDICINE
Payer: COMMERCIAL

## 2024-08-04 ENCOUNTER — APPOINTMENT (OUTPATIENT)
Dept: GENERAL RADIOLOGY | Facility: HOSPITAL | Age: 64
End: 2024-08-04
Attending: EMERGENCY MEDICINE
Payer: COMMERCIAL

## 2024-08-04 VITALS
WEIGHT: 217 LBS | OXYGEN SATURATION: 98 % | SYSTOLIC BLOOD PRESSURE: 169 MMHG | RESPIRATION RATE: 20 BRPM | HEART RATE: 66 BPM | BODY MASS INDEX: 28 KG/M2 | DIASTOLIC BLOOD PRESSURE: 84 MMHG | TEMPERATURE: 98 F

## 2024-08-04 DIAGNOSIS — M25.561 ACUTE PAIN OF RIGHT KNEE: Primary | ICD-10-CM

## 2024-08-04 PROCEDURE — 99284 EMERGENCY DEPT VISIT MOD MDM: CPT

## 2024-08-04 PROCEDURE — 99283 EMERGENCY DEPT VISIT LOW MDM: CPT

## 2024-08-04 PROCEDURE — 73562 X-RAY EXAM OF KNEE 3: CPT | Performed by: EMERGENCY MEDICINE

## 2024-08-04 RX ORDER — NAPROXEN 500 MG/1
500 TABLET ORAL 2 TIMES DAILY PRN
Qty: 20 TABLET | Refills: 0 | Status: SHIPPED | OUTPATIENT
Start: 2024-08-04 | End: 2024-08-11

## 2024-08-04 RX ORDER — HYDROCODONE BITARTRATE AND ACETAMINOPHEN 5; 325 MG/1; MG/1
1-2 TABLET ORAL EVERY 6 HOURS PRN
Qty: 10 TABLET | Refills: 0 | Status: SHIPPED | OUTPATIENT
Start: 2024-08-04 | End: 2024-08-11

## 2024-08-04 NOTE — ED PROVIDER NOTES
Patient Seen in: [facility] Emergency Department      History     Chief Complaint   Patient presents with    Leg or Foot Injury     Stated Complaint: right knee pain after he twisted it yesterday    Subjective:   HPI    Patient has had pain by his right knee for several weeks.  He is actually due to see his primary care doctor who practices sports medicine this week.  He was stepping  down some stairs yesterday at his son's wedding and landed a bit awkwardly, twisted his knee he is not able to bear weight since.  No falls.    Objective:   Past Medical History:    Calculus of kidney    Enlarged lymph node    Essential hypertension    Hearing loss    High cholesterol    Sarcoidosis    Sarcoidosis    Wears glasses              No pertinent past surgical history.              Social History     Socioeconomic History    Marital status:    Tobacco Use    Smoking status: Never    Smokeless tobacco: Never   Vaping Use    Vaping status: Never Used   Substance and Sexual Activity    Alcohol use: Yes     Alcohol/week: 1.0 standard drink of alcohol     Types: 1 Cans of beer per week     Comment: Occasional    Drug use: No              Review of Systems    Positive for stated Chief Complaint: Leg or Foot Injury    Other systems are as noted in HPI.  Constitutional and vital signs reviewed.      All other systems reviewed and negative except as noted above.    Physical Exam     ED Triage Vitals [08/04/24 1157]   BP (!) 169/84   Pulse 66   Resp 20   Temp 98.1 °F (36.7 °C)   Temp src Oral   SpO2 98 %   O2 Device None (Room air)       Current Vitals:   Vital Signs  BP: (!) 169/84  Pulse: 66  Resp: 20  Temp: 98.1 °F (36.7 °C)  Temp src: Oral    Oxygen Therapy  SpO2: 98 %  O2 Device: None (Room air)            Physical Exam    Physical Exam   Constitutional: Awake, alert, well appearing  Head: Normocephalic and atraumatic.   Eyes: Conjunctivae are normal. Pupils are equal, round, and reactive to light.   Neck: Normal  range of motion. No JVD  Cardiovascular: Normal rate, regular rhythm  Pulmonary/Chest: Normal effort.  No accessory muscle use.  No cyanosis.  Abdominal: Soft. Not distended.  Neurological: Pt is alert and oriented to person, place, and time. no cranial nerve deficits. Speech fluent      Visual inspection of the right knee is unremarkable.  The joint is stable with no varus valgus laxity negative anterior posterior drawer.  There is no bony tenderness.        ED Course   Labs Reviewed - No data to display              XR KNEE (3 VIEWS), RIGHT (CPT=73562)    Result Date: 8/4/2024  CONCLUSION:  No acute fractures.  Mild osteoarthritic changes.   LOCATION:  [facility]   Dictated by (CST): Zeyad Littlejohn MD on 8/04/2024 at 1:23 PM     Finalized by (CST): Zeyad Littlejohn MD on 8/04/2024 at 1:23 PM               MDM            Differential diagnoses considered: Favor meniscus injury, the possibility of an ACL, collateral ligament or bony injury was also considered.    -Continue supportive brace, weight-bear as tolerated with crutches.  NSAIDs, Norco for breakthrough pain.    *Prescription sent to the pharmacy      I visualized the radiology studies, my independent interpretation:   No displaced fracture noted on x-ray      Shared decision making was done by: patient, myself.                                       Medical Decision Making      Disposition and Plan     Clinical Impression:  1. Acute pain of right knee         Disposition:  Discharge  8/4/2024  1:21 pm    Follow-up:  Drew Horne MD  [address] [place] [id number]  [phone]    Follow up            Medications Prescribed:  Discharge Medication List as of 8/4/2024  1:22 PM        START taking these medications    Details   naproxen 500 MG Oral Tab Take 1 tablet (500 mg total) by mouth 2 (two) times daily as needed., Normal, Disp-20 tablet, R-0      HYDROcodone-acetaminophen 5-325 MG Oral Tab Take 1-2 tablets by mouth every 6 (six) hours as  needed., Normal, Disp-10 tablet, R-0

## 2024-08-04 NOTE — ED INITIAL ASSESSMENT (HPI)
63YM c/c of R knee pain Pt state that last night he fall landing on his R knee. Pt state that today he cannot bear any weight on his knee

## 2024-08-06 ENCOUNTER — OFFICE VISIT (OUTPATIENT)
Dept: INTERNAL MEDICINE CLINIC | Facility: CLINIC | Age: 64
End: 2024-08-06
Payer: COMMERCIAL

## 2024-08-06 VITALS
DIASTOLIC BLOOD PRESSURE: 70 MMHG | WEIGHT: 224 LBS | OXYGEN SATURATION: 100 % | HEART RATE: 66 BPM | TEMPERATURE: 97 F | BODY MASS INDEX: 29 KG/M2 | SYSTOLIC BLOOD PRESSURE: 126 MMHG

## 2024-08-06 DIAGNOSIS — M23.91 INTERNAL DERANGEMENT OF RIGHT KNEE: ICD-10-CM

## 2024-08-06 DIAGNOSIS — M25.561 ACUTE PAIN OF RIGHT KNEE: Primary | ICD-10-CM

## 2024-08-06 DIAGNOSIS — R55 VASOVAGAL SYNCOPE: ICD-10-CM

## 2024-08-06 LAB
ATRIAL RATE: 63 BPM
P AXIS: 60 DEGREES
P-R INTERVAL: 164 MS
Q-T INTERVAL: 444 MS
QRS DURATION: 98 MS
QTC CALCULATION (BEZET): 454 MS
R AXIS: 60 DEGREES
T AXIS: 25 DEGREES
VENTRICULAR RATE: 63 BPM

## 2024-08-06 PROCEDURE — 99214 OFFICE O/P EST MOD 30 MIN: CPT | Performed by: FAMILY MEDICINE

## 2024-08-06 PROCEDURE — 93000 ELECTROCARDIOGRAM COMPLETE: CPT | Performed by: FAMILY MEDICINE

## 2024-08-06 NOTE — PROGRESS NOTES
Zachariah Rivera  9/18/1960    Chief Complaint   Patient presents with    Knee Pain     R behind knee pain   Possible injury from golf  Has been seen at ER and Xray done        HPI:   Zachariah Rivera is a 63 year old male who presents for evaluation subacute right knee pain.  He states his pain began while he was golfing but worsened while he was stepping down from a bus.  He is unsure if he had a significant twisting injury but that is when his pain worsened.  He was seen over the weekend at the ED and x-ray was performed that showed mild arthritic changes.  He has been using crutches for ambulation and also has a knee brace.  He denies significant locking but has some instability secondary to pain.  He also experienced a brief presyncopal episode while he was at a wedding about 2 weeks ago.  He states the room he was in was very hot.  He was trying to maintain hydration during this time.  He denied preceding chest pain, shortness of breath, or palpitations.    Current Outpatient Medications   Medication Sig Dispense Refill    naproxen 500 MG Oral Tab Take 1 tablet (500 mg total) by mouth 2 (two) times daily as needed. 20 tablet 0    HYDROcodone-acetaminophen 5-325 MG Oral Tab Take 1-2 tablets by mouth every 6 (six) hours as needed. 10 tablet 0    amLODIPine 5 MG Oral Tab Take 1 tablet (5 mg total) by mouth daily. 90 tablet 3    atorvastatin 20 MG Oral Tab Take 1 tablet (20 mg total) by mouth nightly. 90 tablet 1    Sildenafil Citrate 100 MG Oral Tab Take 1 tablet (100 mg total) by mouth daily as needed for Erectile Dysfunction. 30 tablet 3    Fluticasone Propionate (FLONASE) 50 MCG/ACT Nasal Suspension 2 sprays by Each Nare route daily. 1 Bottle 0    Multiple Vitamins-Minerals (MULTIVITAMIN OR) Take  by mouth.        Allergies   Allergen Reactions    Pcn [Penicillins]       Past Medical History:    Calculus of kidney    Enlarged lymph node    Essential hypertension    Hearing loss    High  cholesterol    Sarcoidosis    Sarcoidosis    Wears glasses      Patient Active Problem List   Diagnosis    Change in bowel habits    Screening for colon cancer    Sarcoidosis    Chronic headaches    Rotator cuff syndrome of right shoulder    Localized osteoarthritis of right shoulder    Bilateral hearing loss    Special screening for malignant neoplasm of colon      Past Surgical History:   Procedure Laterality Date    Colonoscopy      Tonsillectomy        Family History   Problem Relation Age of Onset    Cancer Father         Lung    Lipids Mother     Heart Disorder Paternal Grandmother       Social History     Socioeconomic History    Marital status:    Tobacco Use    Smoking status: Never    Smokeless tobacco: Never   Vaping Use    Vaping status: Never Used   Substance and Sexual Activity    Alcohol use: Yes     Alcohol/week: 1.0 standard drink of alcohol     Types: 1 Cans of beer per week     Comment: Occasional    Drug use: No         REVIEW OF SYSTEMS:   GENERAL: See HPI, no anginal symptoms, no dyspnea, no palpitations. See HPI    EXAM:   /70 (BP Location: Right arm, Patient Position: Sitting, Cuff Size: adult)   Pulse 66   Temp 97 °F (36.1 °C) (Temporal)   Wt 224 lb (101.6 kg)   SpO2 100%   BMI 29.37 kg/m²   GENERAL: Well developed, well nourished,in no apparent distress  SKIN: No rashes,no suspicious lesions  EYES: PERRLA, EOMI, conjunctiva are clear  HEENT: atraumatic, normocephalic,ears and throat are clear  NECK: supple,no adenopathy,no bruits  LUNGS: clear to auscultation  CARDIO: RRR without murmur  MSK: Evaluation of patient's right knee reveals no obvious deformity.  He has generally preserved range of motion.  He has a mild joint effusion.  He has reproducible pain with terminal flexion testing and Thessaly's.  Equivocal Roxy's.  Stable ligamentous exam.  No significant hamstring tenderness or pain with resisted knee flexion or hip extension.    ASSESSMENT AND PLAN:   Zachariah  Shawn Rivera is a 63 year old male who presents for evaluation    Acute pain of right knee  Internal derangement of right knee  Concern for meniscal injury based on exam and mechanism. Reviewed XR from ED. Discussed hinged knee brace, ROM exercises, assisted ambulation, icing and anti-inflammatories until MRI completed. Further recommendations pending results.    - MRI KNEE, RIGHT (BTB=[id number]); Future  - z [employer] MRI KNEE, RIGHT (VOK=[id number]); Future  - z [employer] MRI KNEE, RIGHT (ENT=[id number])    Vasovagal syncope  Symptoms likely vasovagal. No associated preceding cardiac symptoms or focal neurologic deficits. No recurrence of symptoms. EKG in office shows NSR with no ischemic changes or arrhythmias. Exam normal. Discussed precautions.   - ELECTROCARDIOGRAM, COMPLETE      All questions were answered and the patient agrees with the plan.     Thank you,  Drew Horne MD

## 2024-08-12 DIAGNOSIS — M25.561 ACUTE PAIN OF RIGHT KNEE: Primary | ICD-10-CM

## 2024-08-12 DIAGNOSIS — M23.91 INTERNAL DERANGEMENT OF RIGHT KNEE: ICD-10-CM

## 2024-08-28 ENCOUNTER — OFFICE VISIT (OUTPATIENT)
Dept: ORTHOPEDICS CLINIC | Facility: CLINIC | Age: 64
End: 2024-08-28
Payer: COMMERCIAL

## 2024-08-28 ENCOUNTER — TELEPHONE (OUTPATIENT)
Dept: ORTHOPEDICS CLINIC | Facility: CLINIC | Age: 64
End: 2024-08-28

## 2024-08-28 VITALS — WEIGHT: 217 LBS | HEIGHT: 73 IN | BODY MASS INDEX: 28.76 KG/M2

## 2024-08-28 DIAGNOSIS — M65.9 SYNOVITIS OF KNEE: ICD-10-CM

## 2024-08-28 DIAGNOSIS — M94.261 CHONDROMALACIA OF RIGHT KNEE: ICD-10-CM

## 2024-08-28 DIAGNOSIS — S83.241A OTHER TEAR OF MEDIAL MENISCUS OF RIGHT KNEE AS CURRENT INJURY, INITIAL ENCOUNTER: Primary | ICD-10-CM

## 2024-08-28 DIAGNOSIS — M23.321 DERANGEMENT OF POSTERIOR HORN OF MEDIAL MENISCUS OF RIGHT KNEE: ICD-10-CM

## 2024-08-28 PROCEDURE — 99205 OFFICE O/P NEW HI 60 MIN: CPT | Performed by: ORTHOPAEDIC SURGERY

## 2024-08-28 NOTE — H&P
Orthopaedic Surgery  [address] [place] [id number]  [phone]     NEW PATIENT VISIT - HISTORY AND PHYSICAL EXAMINATION     Name: Zachariah Rivera   MRN: [id number]  Date: 8/28/2024     CC: Right Knee Pain    REFERRED BY: Drew Horne MD    HPI:   Zachariah Rivera is a very pleasant 63 year old male who presents today for evaluation of right knee pain since early July 2024, initially waking up with unexplained discomfort. Recently, while attending his son's wedding, he twisted his knee while descending stairs on a bus and landed on the affected knee. Following the incident, he was evaluated by Dr. Horne and underwent an MRI. Current symptoms include pain rated at 5/10, accompanied by stiffness, swelling, and weakness, all localized underneath the kneecap, with progressive worsening over time.    He enjoys golfing.    PMH:   Past Medical History:    Calculus of kidney    Enlarged lymph node    Essential hypertension    Hearing loss    High cholesterol    Sarcoidosis    Sarcoidosis    Wears glasses       PAST SURGICAL HX:  Past Surgical History:   Procedure Laterality Date    Colonoscopy      Tonsillectomy         FAMILY HX:  Family History   Problem Relation Age of Onset    Cancer Father         Lung    Lipids Mother     Heart Disorder Paternal Grandmother        ALLERGIES:  Pcn [penicillins]    MEDICATIONS:   Current Outpatient Medications   Medication Sig Dispense Refill    amLODIPine 5 MG Oral Tab Take 1 tablet (5 mg total) by mouth daily. 90 tablet 3    atorvastatin 20 MG Oral Tab Take 1 tablet (20 mg total) by mouth nightly. 90 tablet 1    Sildenafil Citrate 100 MG Oral Tab Take 1 tablet (100 mg total) by mouth daily as needed for Erectile Dysfunction. 30 tablet 3    Fluticasone Propionate (FLONASE) 50 MCG/ACT Nasal Suspension 2 sprays by Each Nare route daily. 1 Bottle 0    Multiple Vitamins-Minerals (MULTIVITAMIN OR) Take  by mouth.         ROS: A comprehensive 14 point review of  systems was performed and was negative aside from the aforementioned per history of present illness.    SOCIAL HX:  Social History     Tobacco Use    Smoking status: Never    Smokeless tobacco: Never   Substance Use Topics    Alcohol use: Yes     Alcohol/week: 1.0 standard drink of alcohol     Types: 1 Cans of beer per week     Comment: Occasional       PE:   Vitals:    08/28/24 0932   Weight: 217 lb   Height: 6' 1\" (1.854 m)     Estimated body mass index is 28.63 kg/m² as calculated from the following:    Height as of this encounter: 6' 1\" (1.854 m).    Weight as of this encounter: 217 lb.    Physical Exam  Constitutional:       Appearance: Normal appearance.   HENT:      Head: Normocephalic and atraumatic.   Eyes:      Extraocular Movements: Extraocular movements intact.   Neck:      Musculoskeletal: Normal range of motion and neck supple.   Cardiovascular:      Pulses: Normal pulses.   Pulmonary:      Effort: Pulmonary effort is normal. No respiratory distress.   Abdominal:      General: There is no distension.   Skin:     General: Skin is warm.      Capillary Refill: Capillary refill takes less than 2 seconds.      Findings: No bruising.   Neurological:      General: No focal deficit present.      Mental Status: Alert.   Psychiatric:         Mood and Affect: Mood normal.     Examination of the right knee demonstrates:   Skin is intact, warm and dry.   Atrophy: {Blank single:19197::\"mild\",\"moderate\",\"significant\",\"none\"}    Effusion: {Blank single:19197::\"small\",\"moderate\",\"large\",\"none\"}    Joint line tenderness: {Blank single:19197::\"medial\",\"lateral\",\"both\",\"none\"}  Crepitation: {Blank single:26365::\"mild\",\"moderate\",\"significant\",\"none\"}   Roxy: {Blank single:19197::\"Positive\",\"Negative\"}   Patellar mobility: {Blank single:19197::\"hypermobile without apprehension\",\"apprehension\",\"normal without apprehension\"}  J-sign: {Blank single:19197::\"mild\",\"moderate\",\"significant\",\"none\"}    ROM: Extension {Blank  single:89808::\"hyperextension\",\"lacking less than 5 degrees\",\"lacking *** degrees\",\"full\"}  Flexion {Natasha single:05076::\"***\",'60 degrees\",\"90 degrees\",\"120 degrees\",\"140 degrees\"}  ACL:  {Natasha single:38916::\"***\",\"Negative Lachman, Negative Pivot Shift\"}   PCL:  {Natasha single:78112::\"***\",\"Negative Posterior Drawer\"}  Collateral Ligaments: {Natasha single:44217::\"***\",\"Stable to Varus and Valgus stress at 0 and 30 degrees\"}  Strength: {Natasha single:96747::\"mild weakness\",\"significantly weak\",\"normal\"}   Hip joint: {Natasha single:19197::\"pain with Flexion IR\",\"ROM abnormality: ***\",\"normal pain-free ROM\"}   Gait:  {Natasha single:60829::\"mildly antalgic\",\"significantly antalgic\",\"using assistive device\",\"normal\"}   Leg length: {Natasha single:19197::\"***\",\"within 1/4\"\",\"equal and symmetric\"}  Alignment:  {Natasha single:19197::\"varus\",\"valgus\",\"neutral\"}     No obvious peripheral edema noted.   Distal neurovascular exam demonstrates normal perfusion, intact sensation to light touch and full strength.     Examination of the contralateral knee demonstrates:  No significant atrophy, swelling or effusion. Full range of motion. Neurovascularly intact distally    Radiographic Examination/Diagnostics:  Knee XR personally viewed, independently interpreted and radiology report was reviewed.    z Insight MRI KNEE, RIGHT (HAI=11525)    Result Date: 8/9/2024  Exam: MRI KNEE RT W/O CONTRAST CPT Code(s): 77903 - MRI JNT OF LWR EXTRE W/O DYE EXAM: MRI right knee without contrast 8/9/2024. COMPARISON:  No prior images or imaging reports are available for comparison at the time of this dictation. INDICATION:  Acute pain of right knee. Internal derangement of right knee. Swelling. Limited range of motion. Right knee pain. Recent reinjury. TECHNIQUE:  . Multiple and multisequence MR images of the right knee were acquired on a wide bore ultra high field 3.0 T Siemens Skyra MR scanner without intravenous contrast. FINDINGS:  The anterior and  posterior cruciate ligaments, as well as the fibular collateral ligament appear intact. Mild periligamentous edema along the medial collateral ligament suggests mild sprain. Small joint effusion. No discrete loose body is identified. The quadriceps, patellar, and biceps femoris tendons appear intact. Mild edema within the popliteus and semimembranosus tendons is compatible tendinosis. Small Baker's cyst. The distal iliotibial band appears intact. No acute fracture. No significant localized fatty atrophy of the visualized musculature. Neurovascular structures appear grossly intact. Mild subcutaneous edema soft tissue edema is more pronounced along the anterior knee. In the medial compartment, there is mild to moderate cartilage loss, more pronounced at the mid weightbearing medial femoral condyle. Small marginal osteophytes. The body of the medial meniscus is slightly extruded, with and nonspecific intrasubstance signal abnormality suggesting degeneration, with fraying of the free edge. Irregularity and indistinctness compatible with tear of the posterior root attachment of the medial meniscus. In the lateral compartment, there is a 3 x 8 mm intermediate to high-grade chondral defect at the mid to posterior weightbearing lateral femoral condyle. Mild the heterogeneous signal intensity of the articular cartilage of the lateral tibial plateau. Mild heterogeneity and slightly attenuated appearance of the of the posterior root of the lateral meniscus may represent or degeneration and/or fraying, with tear difficult to exclude. In the patellofemoral compartment, there is mild to moderate cartilage loss of the patellar apex, more pronounced at the upper pole, where there is mild subchondral marrow edema. Moderate to severe cartilage loss of the medial patellar facet. Tiny marginal osteophytes.   IMPRESSION:   1. Mild medial collateral ligament sprain.   2. Tear of the posterior root of the medial meniscus, with  degeneration and fraying of the body. Degeneration and fraying of the posterior root of the lateral meniscus, with tear difficult to exclude.   3. Mild popliteus and semimembranosus tendinosis with a small Baker's cyst.   4. Mild osteoarthritic changes, with cartilage loss more pronounced from the patellar apex to medial patellar facet, as well as at the medial femoral condyle. 3 x 8 mm intermediate to high-grade chondral defect at the lateral femoral condyle.   5. Small joint effusion.   This report was performed utilizing speech recognition software technology. Despite thorough proofreading, speech recognition errors could escape detection. If a word or phrase is confusing or out of context, please do not hesitate to call for clarification. Interpreting Radiologist: Jackie Stoner M.D. Electronically Signed: 08/09/2024 06:44 PM    XR KNEE (3 VIEWS), RIGHT (CPT=73562)    Result Date: 8/4/2024  PROCEDURE:  XR KNEE ROUTINE (3 VIEWS), RIGHT (CPT=73562)  TECHNIQUE:  Three views were obtained including patellar view.  COMPARISON:  None.  INDICATIONS:  Right knee pain  PATIENT STATED HISTORY: (As transcribed by Technologist)  Patient states he twisted his right knee and now cannot walk well/bear down weight.     FINDINGS:  No acute fractures or osseous lesions.  Patellar femoral relationship is within normal limits.  Small suprapatellar  joint effusion.  Mild osteoarthritic changes.            CONCLUSION:  No acute fractures.  Mild osteoarthritic changes.   LOCATION:  [facility]   Dictated by (CST): Zeyad Littlejohn MD on 8/04/2024 at 1:23 PM     Finalized by (CST): Zeyad Littlejohn MD on 8/04/2024 at 1:23 PM         IMPRESSION: Zachariah Rivera is a 63 year old male ***    PLAN:   We had a detailed discussion outlining the etiology, anatomy, pathophysiology, and natural history of ***. Imaging was reviewed in detail and correlated to a 3-dimensional model of the knee.     ***    FOLLOW-UP:  {Disposition  clinic:9140}      Rhona Buenrostro MD  Knee, Shoulder, & Elbow Surgery / Sports Medicine Specialist  Orthopaedic Surgery  [address] [place] [address] [id number] [place] [address].org  [email or web address]  t: [phone]  o: [phone]  f: [phone]    This note was dictated using Dragon software.  While it was briefly proofread prior to completion, some grammatical, spelling, and word choice errors due to dictation may still occur.

## 2024-08-28 NOTE — TELEPHONE ENCOUNTER
Date of Surgery:   10/15/2024     Post Op Appt:  10/21/2024 840AM    Case ID: [id number]     Notes: Lets please add for October. Thanks!             OR BOOKING SHEET KNEE ARTHROSCOPY  Location: [x] [facility]                    [x] [facility]  Name: Zachariah Rivera  MRN: [id number]   : 1960  Diagnosis:  [x] Other tear of medial meniscus of right knee as current injury, initial encounter [Q63.214A]  Disposition:    [x] Ambulatory  Operative Time Required: 90 minutes ([facility])  Procedure:  Antibiotics: 2 g cefazolin within 60 minutes of surgical incision (3 g if > 120 kg)  Laterality:                  [x] RIGHT                  [] LEFT                          [] BILATERAL  Procedures:                    [x] Knee Arthroscopy                                                   [] Partial Medial Meniscectomy (06805)                    [] Partial Lateral Meniscectomy (32013)                    [] Partial Medial and Lateral Meniscectomy (70783)                       [] Lateral Meniscus Repair (03096)                    [x] Medial Meniscus Root Repair (60709)                       [x] Synovectomy (04847)                    [x] Microfracture of Notch (19841)                       [] Partial Medial Meniscectomy vs Medial Meniscus Repair (98747 vs 18471)                    [] Partial Lateral Meniscectomy vs Lateral Meniscus Repair (22000 vs 20773)                    [] Irrigation & Debridement (87280)                    [] Manipulation Under Anesthesia (78111)                    [] Chondroplasty (55473)                    [] Removal of Loose Body (91551)     Injections:                    [] Stem cells from bone marrow aspiration (07076)                                      From:                   [] Left Iliac crest                   [] Right Iliac crest                                      To:                   [] Left knee         [] Right knee                          [] Other      Additional info:   [x] PCP Clearance  Needed  [] MRSA  [x] Physical Therapy External - [facility]  [] DME Rx  [] Appt with Dr. Buenrostro needed  Implants needed: Arthrex, Combs and Nephew  Positioning Equipment: Supine with Lateral Post

## 2024-08-28 NOTE — PROGRESS NOTES
OR BOOKING SHEET KNEE ARTHROSCOPY  Location: [x] [facility]   [x] [facility]  Name: Zachariah Rivera  MRN: [id number]   : 1960  Diagnosis:  [x] Other tear of medial meniscus of right knee as current injury, initial encounter [S83.278A]  Disposition:    [x] Ambulatory  Operative Time Required: 90 minutes ([facility])  Procedure:  Antibiotics: 2 g cefazolin within 60 minutes of surgical incision (3 g if > 120 kg)  Laterality: [x] RIGHT  [] LEFT                       [] BILATERAL  Procedures:   [x] Knee Arthroscopy     [] Partial Medial Meniscectomy (49111)   [] Partial Lateral Meniscectomy (29949)                    [] Partial Medial and Lateral Meniscectomy (74027)     [] Lateral Meniscus Repair (75650)                    [x] Medial Meniscus Root Repair (81858)     [x] Synovectomy (24595)   [x] Microfracture of Notch (81797)     [] Partial Medial Meniscectomy vs Medial Meniscus Repair (67593 vs 39385)   [] Partial Lateral Meniscectomy vs Lateral Meniscus Repair (95175 vs 11224)   [] Irrigation & Debridement (25709)   [] Manipulation Under Anesthesia (28330)   [] Chondroplasty (90345)   [] Removal of Loose Body (81916)    Injections:   [] Stem cells from bone marrow aspiration (26336)    From:  [] Left Iliac crest  [] Right Iliac crest    To:  [] Left knee  [] Right knee  [] Other     Additional info:   [x] PCP Clearance Needed  [] MRSA  [x] Physical Therapy External - [facility]  [] DME Rx  [] Appt with Dr. Buenrostro needed  Implants needed: Arthrex, Combs and Nephew  Positioning Equipment: Supine with Lateral Post

## 2024-08-28 NOTE — H&P
Orthopaedic Surgery  [address] [place] [id number]  [phone]     PRE SURGICAL - HISTORY AND PHYSICAL EXAMINATION     Name: Zachariah Rivera   MRN: [id number]  Date: 8/28/2024     CC: Right Knee Pain and mechanical symptoms    REFERRED BY: Drew Horne MD    HPI:   Zachariah Rivera is a very pleasant 63 year old male who presents today for evaluation of Right knee pain and mechanical symptoms and recent completion of MRI demonstrating meniscal pathology.     To summarize: right knee pain since early July 2024, initially waking up with unexplained discomfort. Recently, while attending his son's wedding, he twisted his knee while descending stairs on a bus and landed on the affected knee. Following the incident, he was evaluated by Dr. Horne and obtained a MRI. Current symptoms include pain rated at 5/10, accompanied by stiffness, swelling, and weakness, all localized underneath the kneecap, with progressive worsening over time.     He enjoys golfing.    PMH:   Past Medical History:    Calculus of kidney    Enlarged lymph node    Essential hypertension    Hearing loss    High cholesterol    Sarcoidosis    Sarcoidosis    Wears glasses       PAST SURGICAL HX:  Past Surgical History:   Procedure Laterality Date    Colonoscopy      Tonsillectomy         FAMILY HX:  Family History   Problem Relation Age of Onset    Cancer Father         Lung    Lipids Mother     Heart Disorder Paternal Grandmother        ALLERGIES:  Pcn [penicillins]    MEDICATIONS:   Current Outpatient Medications   Medication Sig Dispense Refill    amLODIPine 5 MG Oral Tab Take 1 tablet (5 mg total) by mouth daily. 90 tablet 3    atorvastatin 20 MG Oral Tab Take 1 tablet (20 mg total) by mouth nightly. 90 tablet 1    Sildenafil Citrate 100 MG Oral Tab Take 1 tablet (100 mg total) by mouth daily as needed for Erectile Dysfunction. 30 tablet 3    Fluticasone Propionate (FLONASE) 50 MCG/ACT Nasal Suspension 2 sprays by Each  Nare route daily. 1 Bottle 0    Multiple Vitamins-Minerals (MULTIVITAMIN OR) Take  by mouth.         ROS: A comprehensive 14 point review of systems was performed and was negative aside from the aforementioned per history of present illness.    SOCIAL HX:  Social History     Tobacco Use    Smoking status: Never    Smokeless tobacco: Never   Substance Use Topics    Alcohol use: Yes     Alcohol/week: 1.0 standard drink of alcohol     Types: 1 Cans of beer per week     Comment: Occasional       PE:   Vitals:    08/28/24 0932   Weight: 217 lb   Height: 6' 1\" (1.854 m)     Estimated body mass index is 28.63 kg/m² as calculated from the following:    Height as of this encounter: 6' 1\" (1.854 m).    Weight as of this encounter: 217 lb.    Physical Exam  Constitutional:       Appearance: Normal appearance.   HENT:      Head: Normocephalic and atraumatic.   Eyes:      Extraocular Movements: Extraocular movements intact.   Neck:      Musculoskeletal: Normal range of motion and neck supple.   Cardiovascular:      Pulses: Normal pulses.   Pulmonary:      Effort: Pulmonary effort is normal. No respiratory distress.   Abdominal:      General: There is no distension.   Skin:     General: Skin is warm.      Capillary Refill: Capillary refill takes less than 2 seconds.      Findings: No bruising.   Neurological:      General: No focal deficit present.      Mental Status: Alert.   Psychiatric:         Mood and Affect: Mood normal.     Examination of the right knee demonstrates:     Skin is intact, warm and dry.   Atrophy: none    Effusion: small    Joint line tenderness: medial  Crepitation: none   Roxy: Positive   Patellar mobility: normal without apprehension  J-sign: none    ROM: Extension lacking less than 5 degrees  Flexion 120 degrees  ACL:  Negative Lachman, Negative Pivot Shift   PCL:  Negative Posterior Drawer  Collateral Ligaments: Stable to Varus and Valgus stress at 0 and 30 degrees  Strength: mild weakness   Hip  joint: normal pain-free ROM   Gait:  mildly antalgic   Leg length: equal and symmetric  Alignment:  neutral     No obvious peripheral edema noted.   Distal neurovascular exam demonstrates normal perfusion, intact sensation to light touch and full strength.     Examination of the contralateral knee demonstrates:  No significant atrophy, swelling or effusion. Full range of motion. Neurovascularly intact distally.    Radiographic Examination/Diagnostics:  XR and MRI of the knee personally viewed, independently interpreted and radiology report was reviewed.    [facility] Insight MRI KNEE, RIGHT (HGD=43036)    Result Date: 8/9/2024  Exam: MRI KNEE RT W/O CONTRAST CPT Code(s): 61764 - MRI JNT OF LWR EXTRE W/O DYE EXAM: MRI right knee without contrast 8/9/2024. COMPARISON:  No prior images or imaging reports are available for comparison at the time of this dictation. INDICATION:  Acute pain of right knee. Internal derangement of right knee. Swelling. Limited range of motion. Right knee pain. Recent reinjury. TECHNIQUE:  . Multiple and multisequence MR images of the right knee were acquired on a wide bore ultra high field 3.0 T Siemens Skyra MR scanner without intravenous contrast. FINDINGS:  The anterior and posterior cruciate ligaments, as well as the fibular collateral ligament appear intact. Mild periligamentous edema along the medial collateral ligament suggests mild sprain. Small joint effusion. No discrete loose body is identified. The quadriceps, patellar, and biceps femoris tendons appear intact. Mild edema within the popliteus and semimembranosus tendons is compatible tendinosis. Small Baker's cyst. The distal iliotibial band appears intact. No acute fracture. No significant localized fatty atrophy of the visualized musculature. Neurovascular structures appear grossly intact. Mild subcutaneous edema soft tissue edema is more pronounced along the anterior knee. In the medial compartment, there is mild to moderate cartilage  loss, more pronounced at the mid weightbearing medial femoral condyle. Small marginal osteophytes. The body of the medial meniscus is slightly extruded, with and nonspecific intrasubstance signal abnormality suggesting degeneration, with fraying of the free edge. Irregularity and indistinctness compatible with tear of the posterior root attachment of the medial meniscus. In the lateral compartment, there is a 3 x 8 mm intermediate to high-grade chondral defect at the mid to posterior weightbearing lateral femoral condyle. Mild the heterogeneous signal intensity of the articular cartilage of the lateral tibial plateau. Mild heterogeneity and slightly attenuated appearance of the of the posterior root of the lateral meniscus may represent or degeneration and/or fraying, with tear difficult to exclude. In the patellofemoral compartment, there is mild to moderate cartilage loss of the patellar apex, more pronounced at the upper pole, where there is mild subchondral marrow edema. Moderate to severe cartilage loss of the medial patellar facet. Tiny marginal osteophytes.   IMPRESSION:   1. Mild medial collateral ligament sprain.   2. Tear of the posterior root of the medial meniscus, with degeneration and fraying of the body. Degeneration and fraying of the posterior root of the lateral meniscus, with tear difficult to exclude.   3. Mild popliteus and semimembranosus tendinosis with a small Baker's cyst.   4. Mild osteoarthritic changes, with cartilage loss more pronounced from the patellar apex to medial patellar facet, as well as at the medial femoral condyle. 3 x 8 mm intermediate to high-grade chondral defect at the lateral femoral condyle.   5. Small joint effusion.   This report was performed utilizing speech recognition software technology. Despite thorough proofreading, speech recognition errors could escape detection. If a word or phrase is confusing or out of context, please do not hesitate to call for  clarification. Interpreting Radiologist: Jackie Stoner M.D. Electronically Signed: 08/09/2024 06:44 PM    XR KNEE (3 VIEWS), RIGHT (CPT=73562)    Result Date: 8/4/2024  PROCEDURE:  XR KNEE ROUTINE (3 VIEWS), RIGHT (CPT=73562)  TECHNIQUE:  Three views were obtained including patellar view.  COMPARISON:  None.  INDICATIONS:  Right knee pain  PATIENT STATED HISTORY: (As transcribed by Technologist)  Patient states he twisted his right knee and now cannot walk well/bear down weight.     FINDINGS:  No acute fractures or osseous lesions.  Patellar femoral relationship is within normal limits.  Small suprapatellar  joint effusion.  Mild osteoarthritic changes.            CONCLUSION:  No acute fractures.  Mild osteoarthritic changes.   LOCATION:  [facility]   Dictated by (CST): Zeyad Littlejohn MD on 8/04/2024 at 1:23 PM     Finalized by (CST): Zeyad Littlejohn MD on 8/04/2024 at 1:23 PM         IMPRESSION: Zachariah Rivera is a 63 year old male with right medial meniscus root tear, chondromalacia and synovitis.  They have failed extensive conservative treatment including oral anti-inflammatory medications and activity modification.     Consequently, they are an appropriate candidate for knee arthroscopy and medial mensicus root repair. This will be augmented with microfracture of notch.     PLAN:   I had a lengthy discussion with Zachariah about the diagnosis and options, both surgical and nonsurgical. I have recommended that we proceed with arthroscopic surgical treatment as we agree that this intervention will likely offer the best opportunity for symptomatic relief and functional recovery. I used the MRI scan and a 3-dimensional knee model to outline his pathology, as well as general surgical principles. We reviewed the risks associated with arthroscopic meniscus repair.  In particular I emphasized the limited vascularity of the meniscus and potential for incomplete healing, although this is the preferred treatment.   Simultaneously, I will do a diagnostic knee arthroscopy of the whole knee and hope to identify and address any other pathology that may be encountered such as scar tissue, inflammation, cartilage pathology.  In particular we discussed risks that include, a low risk of infection (<1%), potential transient or permanent injury to nerves with superficial numbness, joint stiffness which may require additional physical therapy, persistent pain/lack of symptomatic improvement, need for future operation, wound complications (rare as incisions are very small), deep vein thrombosis (DVT) and pulmonary embolism (PE).   We discussed the proposed rehabilitation timeline as well as expected postoperative restrictions.  In this regard, I emphasized that there will be weightbearing restrictions after surgery and requirement for crutch utilization ranging from 4 to 6 weeks.  This allows for protecting the meniscus from additional loads in the process of healing.  Physical therapy will still be initiated immediately after surgery to maintain muscle tone and gradually progress with regards to range of motion.  Zachariah voiced a good understanding of treatment options, risks and benefits, postoperative instructions, rehabilitation timeline, and restrictions. He was given the opportunity to ask questions, which were all answered to the best of my ability and to his satisfaction. Zachariah will work with my office to arrange a time for surgery and obtain any medical clearance information necessary. My pre-operative information packet, which details the process and answers many FAQ's will be provided. He was encouraged to call the office with any further questions or concerns.   I spent 60 minutes in preparation to see the patient, counseling/education of relevant pathology, discussing imaging results, ordering post surgical physical therapy intervention, surgical counseling, and care coordination.        FOLLOW-UP:  Post-Operative Visit, POD  6 with Sincer AMY Lock PA-C.       Rhona Buenrostro MD  Knee, Shoulder, & Elbow Surgery / Sports Medicine Specialist  Orthopaedic Surgery  [address] [place] [address].[facility]  [email or web address]  t: [phone]  o: [phone]  f: [phone]    This note was dictated using Dragon software.  While it was briefly proofread prior to completion, some grammatical, spelling, and word choice errors due to dictation may still occur.

## 2024-09-30 ENCOUNTER — OFFICE VISIT (OUTPATIENT)
Dept: INTERNAL MEDICINE CLINIC | Facility: CLINIC | Age: 64
End: 2024-09-30
Payer: COMMERCIAL

## 2024-09-30 VITALS
DIASTOLIC BLOOD PRESSURE: 78 MMHG | BODY MASS INDEX: 29.03 KG/M2 | OXYGEN SATURATION: 99 % | HEART RATE: 63 BPM | WEIGHT: 219 LBS | HEIGHT: 73 IN | SYSTOLIC BLOOD PRESSURE: 130 MMHG | RESPIRATION RATE: 18 BRPM

## 2024-09-30 DIAGNOSIS — E78.00 HYPERCHOLESTEREMIA: ICD-10-CM

## 2024-09-30 DIAGNOSIS — I10 ESSENTIAL HYPERTENSION: ICD-10-CM

## 2024-09-30 DIAGNOSIS — S83.241D TEAR OF MEDIAL MENISCUS OF RIGHT KNEE, CURRENT, UNSPECIFIED TEAR TYPE, SUBSEQUENT ENCOUNTER: ICD-10-CM

## 2024-09-30 DIAGNOSIS — Z01.818 PREOPERATIVE EXAMINATION: Primary | ICD-10-CM

## 2024-09-30 PROCEDURE — 99214 OFFICE O/P EST MOD 30 MIN: CPT | Performed by: FAMILY MEDICINE

## 2024-09-30 NOTE — PROGRESS NOTES
Zachariah Rivera  9/18/1960    Chief Complaint   Patient presents with    Pre-Op Exam     10/15/24 - Total Right Knee arthroscopy.       HPI:   Zachariah Rivera is a 64 year old male who presents for preoperative examination prior to his knee arthroplasty and meniscal repair. No new CP or dyspnea, good functional capacity.     Current Outpatient Medications   Medication Sig Dispense Refill    amLODIPine 5 MG Oral Tab Take 1 tablet (5 mg total) by mouth daily. 90 tablet 3    atorvastatin 20 MG Oral Tab Take 1 tablet (20 mg total) by mouth nightly. 90 tablet 1    Sildenafil Citrate 100 MG Oral Tab Take 1 tablet (100 mg total) by mouth daily as needed for Erectile Dysfunction. 30 tablet 3    Fluticasone Propionate (FLONASE) 50 MCG/ACT Nasal Suspension 2 sprays by Each Nare route daily. 1 Bottle 0    Multiple Vitamins-Minerals (MULTIVITAMIN OR) Take  by mouth.      naproxen 500 MG Oral Tab Take 1 tablet (500 mg total) by mouth 2 (two) times daily as needed. 20 tablet 0      Allergies   Allergen Reactions    Pcn [Penicillins] UNKNOWN     Unsure of reaction, per patient's mother       Past Medical History:    Calculus of kidney    Enlarged lymph node    Essential hypertension    Hearing impaired person, bilateral    bilateral hearing aids    Hearing loss    High cholesterol    Sarcoidosis    Sarcoidosis    Visual impairment    Glasses    Wears glasses      Patient Active Problem List   Diagnosis    Change in bowel habits    Screening for colon cancer    Sarcoidosis    Chronic headaches    Rotator cuff syndrome of right shoulder    Localized osteoarthritis of right shoulder    Bilateral hearing loss    Special screening for malignant neoplasm of colon      Past Surgical History:   Procedure Laterality Date    Colonoscopy      Tonsillectomy        Family History   Problem Relation Age of Onset    Cancer Father         Lung    Lipids Mother     Heart Disorder Paternal Grandmother       Social History      Socioeconomic History    Marital status:    Tobacco Use    Smoking status: Never    Smokeless tobacco: Never   Vaping Use    Vaping status: Never Used   Substance and Sexual Activity    Alcohol use: Yes     Alcohol/week: 1.0 standard drink of alcohol     Types: 1 Cans of beer per week     Comment: Occasional    Drug use: No         REVIEW OF SYSTEMS:   GENERAL: feels well otherwise  SKIN: no rashes  EYES: no new vision changes  HEENT: not congested  LUNGS: no new dyspnea  CARDIOVASCULAR: no new chest pain  GI: no new abdominal pain  NEURO: no headaches    EXAM:   /78   Pulse 63   Resp 18   Ht 6' 1\" (1.854 m)   Wt 219 lb (99.3 kg)   SpO2 99%   BMI 28.89 kg/m²   GENERAL: Well developed, well nourished,in no apparent distress  SKIN: No rashes,no suspicious lesions  EYES: PERRLA, EOMI, conjunctiva are clear  HEENT: atraumatic, normocephalic,ears and throat are clear  NECK: supple,no adenopathy,no bruits  LUNGS: clear to auscultation  CARDIO: RRR without murmur  GI: good BS's,no masses, HSM or tenderness    ASSESSMENT AND PLAN:   Zachariah Rivera is a 64 year old male who presents for preoperative examination prior to his right knee arthroscopy. RCRI score 0. No new anginal symptoms or dyspnea, adequate functional capacity. Discussed perioperative medication management. He may proceed at acceptable risk.     1. Preoperative examination  2. Tear of medial meniscus of right knee  3. HTN  4. HLD    All questions were answered and the patient agrees with the plan.     Thank you,  Drew Horne MD

## 2024-10-01 ENCOUNTER — TELEPHONE (OUTPATIENT)
Dept: INTERNAL MEDICINE CLINIC | Facility: CLINIC | Age: 64
End: 2024-10-01

## 2024-10-01 NOTE — TELEPHONE ENCOUNTER
Patient was seen 9/30 for pre op and we received today 10/1/24 request from anesthesia for patient to have BMP 4 hour fast-this lab was not added to the other pre op labs-please make sure to add ASAP so patient can get done with other labs-request in Dr. Drew Horne file up front

## 2024-10-01 NOTE — TELEPHONE ENCOUNTER
PATIENT IS CLEARED BY PCP FOR SURGERY. OFFICE VISIT 9/30/2024 IN [facility].

## 2024-10-02 ENCOUNTER — LAB ENCOUNTER (OUTPATIENT)
Dept: LAB | Age: 64
End: 2024-10-02
Attending: FAMILY MEDICINE
Payer: COMMERCIAL

## 2024-10-02 DIAGNOSIS — E78.00 HYPERCHOLESTEREMIA: ICD-10-CM

## 2024-10-02 LAB
ALBUMIN SERPL-MCNC: 4.6 G/DL (ref 3.2–4.8)
ALBUMIN/GLOB SERPL: 1.6 {RATIO} (ref 1–2)
ALP LIVER SERPL-CCNC: 59 U/L
ALT SERPL-CCNC: 24 U/L
ANION GAP SERPL CALC-SCNC: 9 MMOL/L (ref 0–18)
AST SERPL-CCNC: 20 U/L (ref ?–34)
BILIRUB SERPL-MCNC: 1.1 MG/DL (ref 0.2–1.1)
BUN BLD-MCNC: 12 MG/DL (ref 9–23)
CALCIUM BLD-MCNC: 9.6 MG/DL (ref 8.7–10.4)
CHLORIDE SERPL-SCNC: 104 MMOL/L (ref 98–112)
CHOLEST SERPL-MCNC: 159 MG/DL (ref ?–200)
CO2 SERPL-SCNC: 28 MMOL/L (ref 21–32)
CREAT BLD-MCNC: 0.99 MG/DL
EGFRCR SERPLBLD CKD-EPI 2021: 86 ML/MIN/1.73M2 (ref 60–?)
FASTING PATIENT LIPID ANSWER: YES
FASTING STATUS PATIENT QL REPORTED: YES
GLOBULIN PLAS-MCNC: 2.9 G/DL (ref 2–3.5)
GLUCOSE BLD-MCNC: 101 MG/DL (ref 70–99)
HDLC SERPL-MCNC: 58 MG/DL (ref 40–59)
LDLC SERPL CALC-MCNC: 82 MG/DL (ref ?–100)
NONHDLC SERPL-MCNC: 101 MG/DL (ref ?–130)
OSMOLALITY SERPL CALC.SUM OF ELEC: 292 MOSM/KG (ref 275–295)
POTASSIUM SERPL-SCNC: 3.7 MMOL/L (ref 3.5–5.1)
PROT SERPL-MCNC: 7.5 G/DL (ref 5.7–8.2)
SODIUM SERPL-SCNC: 141 MMOL/L (ref 136–145)
TRIGL SERPL-MCNC: 106 MG/DL (ref 30–149)
VLDLC SERPL CALC-MCNC: 17 MG/DL (ref 0–30)

## 2024-10-02 PROCEDURE — 80061 LIPID PANEL: CPT

## 2024-10-02 PROCEDURE — 36415 COLL VENOUS BLD VENIPUNCTURE: CPT

## 2024-10-02 PROCEDURE — 80053 COMPREHEN METABOLIC PANEL: CPT

## 2024-10-09 RX ORDER — ASPIRIN 81 MG/1
81 TABLET, CHEWABLE ORAL DAILY
Qty: 30 TABLET | Refills: 0 | Status: SHIPPED | OUTPATIENT
Start: 2024-10-09 | End: 2024-11-08

## 2024-10-09 RX ORDER — ONDANSETRON 4 MG/1
TABLET, FILM COATED ORAL
Qty: 8 TABLET | Refills: 0 | Status: SHIPPED | OUTPATIENT
Start: 2024-10-09

## 2024-10-09 RX ORDER — TRAMADOL HYDROCHLORIDE 50 MG/1
TABLET ORAL
Qty: 20 TABLET | Refills: 1 | Status: SHIPPED | OUTPATIENT
Start: 2024-10-09

## 2024-10-09 NOTE — DISCHARGE INSTRUCTIONS
MENISCUS REPAIR  Post-Operative Guidelines    This document will help you plan for your post-operative recovery course following surgery. Please read and retain this information for future reference. Many of the questions you may have later can be answered by referring to this information.    DIET   Begin with clear liquids and light foods (jellos, soups, etc.).   Progress to your normal diet if you are not nauseated.       POST OPERATIVE BRACE   A hinged knee brace is to be worn for 3-4 weeks after surgery.   Please sleep with this brace on. See additional instructions for brace details.   Okay to remove ACE wrap for ice therapy, but compression is helpful during the first 2 weeks.        CRUTCHES   Week 1-4: Toe-touch weight-bearing with 2 crutches   Approximately 20 lbs - lightly resting the foot on the floor.     You may discontinue the crutches during the second week when you are comfortable with full weight on the leg. Your physical therapist may guide you with this process.       WOUND CARE   Please keep the ACE wrap on under the brace for the first week, you may remove the ACE wrap for ice therapy.  Underneath the ACE wrap are waterproof bandages, please keep these in place until your first post-op appointment with Dr. Buenrostro.  Under the waterproof bandages is Dermabond, this is a surgical glue and tape that is used in conjunction with absorbable stitches to close the incision.   Please do not touch the Dermabond or place any ointments lotions or creams directly over the incisions.  You may shower after removing the ACE wrap by placing Saran wrap around the leg and covering the bandages.  NO soaking of the operative leg (ie: bath or pool) is allowed until 6 weeks after surgery.       PAIN MANAGEMENT  Local numbing medications, or peripheral nerve block are injected into the wound around the knee at the time of surgery. These will wear off within 8-24 hours and it is not uncommon for you to encounter more pain  on the first or second day after surgery when swelling peaks.   Do not drive a car or operate machinery while taking the narcotic medication.   Please avoid alcohol use while taking the narcotic pain medication.     NON-NARCOTIC PAIN MEDICATIONS   Extra-Strength Tylenol (Acetaminophen) 500mg Tablets (available over the counter)  Indication: pain, non-narcotic pain reliever  Use: Take 1-2 tablets every 6 hours.  Do not exceed 8 tablets in a 24-hour period.    Mobic (Meloxicam) 15mg Tablets (Prescription sent to pharmacy)  Indication: pain and anti-inflammatory, non-narcotic pain reliever  Use: Take 1 tablets daily with meals for the first 14 days after surgery.  Side Effects: upset stomach, acid reflux.  If this occurs, stop the medication.    NARCOTIC PAIN MEDICATIONS  OPIOIDS/NARCOTICS are prescription pain medications.  One of the following medications will be prescribed and should only be used if adequate pain control is not achieved with combination of ice, Aleve, and extra-strength Tylenol outlined above.   Side effects include constipation, nausea, drowsiness, dry mouth, itchiness.  Use a 0-10 pain scale to decide how much medication to take:                                   Pain 0-4/10: No narcotics necessary                                  Pain 5-7/10: Take one tablet                                    Pain 8-10/10: Take two tablets   Oxycodone (Roxicodone) 5mg tablet  Indication: pain 5/10 or greater.  Narcotic pain medication.  Use: 1-2 tabs every 4-6 hours as needed for pain.  Do not exceed more than 10 tabs in any 24-hour time period unless otherwise directed.  Tramadol (Ultram) 50mg tablet  Indication: pain 5/10 or greater.  Non-opioid narcotic like medication  Use: 1-2 tabs every 6 hours as needed for pain.  Do not take more than 8 tablets in any 24-hour time period.        medications to manage side effects  Narcotics may cause nausea and constipation. Bowel regimen is recommended.  Colace  (Docusate) 100 mg - available OTC  Indication:  constipation, stool softener.  Take consistently while on narcotics.  Use:  Take 1 pill three times per day while you are taking narcotics.    Senokot (Senna) 8.6 mg - available OTC  Indication: constipation, stool laxative.  Take consistently while on narcotics.   Use: Take 2 pills at bedtime.  Increase to 2 pills twice daily if no bowel movement by post-surgery day 2.    Miralax (Polyethylene Glycol) 17.6 g - available OTC  Indication: constipation, stool laxative.  Take if above medications are not working.  Use: Take one dose at bedtime.  In addition to above with no bowel movement by post-surgery day 2.   Zofran (Ondansetron ODT) 4 mg- Prescription sent to pharmacy  Indication: nausea.  Take as needed.  Use: Take 1 pill AS NEEDED every 8 hours, do not exceed 3 pills in 24 hours      ACTIVITY   Elevate the operative leg to chest level whenever possible to decrease swelling.   Do not place pillows under knees (i.e. do not maintain knee in a flexed or bent position), but rather place pillows under the foot/ankle.   Use crutches to assist with walking - Do not walk without brace on.   Do not engage in activities which increase knee pain/swelling (prolonged periods of standing or walking) for the first 4 weeks following surgery.   Avoid long periods of sitting (without leg elevated) or long distance traveling for 4 weeks.   NO driving until instructed otherwise by physician.   May return to sedentary work ONLY or school 3-4 days after surgery, if pain is tolerable.       ICE THERAPY   Icing is very important in the initial post-operative period and should begin immediately after surgery.   You can remove the ACE wrap for ice therapy  Use icing machine continuously or ice packs (if machine not prescribed) for 30-45 minutes every 2 hours daily until your first post-operative visit - remember to keep leg elevated to level of chest while icing. Care should be taken with  icing to avoid frostbite to the skin.   You do not need to wake up in the middle of the night to change over the ice m  achine or icepacks unless you are uncomfortable.         EXERCISE   Begin exercises 24 hours after surgery (straight leg raises, quad sets, heel slides, and ankle pumps, unless otherwise instructed.   Discomfort and knee stiffness are normal for a few days following surgery. It is safe to bend your knee in a non-weightbearing position when performing exercises unless otherwise instructed. Avoid flexing past 90 degrees.   Complete exercises 3-4 times daily until your first post-operative visit - your motion goals are to have complete extension (straightening) and 90 degrees of flexion (bending) at your first post- operative appointment unless otherwise instructed.   Perform ankle pumps continuously throughout the day to reduce the risk of developing a blood clot in your calf.   Formal physical therapy (PT) typically begins as soon as possible, ideally 1-3 days after surgery.       EMERGENCIES**   Contact Dr. Buenrostro’s Team via [employer] or [phone] if any of the following are present:     Painful swelling or numbness (note that some swelling and numbness is normal).   Unrelenting pain.  Fever (over 101° - it is normal to have a low-grade fever for the first day or two following surgery)   Redness around incisions.   Color change in foot or ankle.   Continuous drainage or bleeding from incision (a small amount of drainage is expected).   Difficulty breathing.   Excessive nausea/vomiting   Calf pain.   If you have an emergency after office hours or on the weekend, contact the office at [phone] and you will be connected to our pager service. This will connect you with the Physician on call.   If you have an emergency that requires immediate attention proceed to the nearest emergency room.       FOLLOW-UP CARE/QUESTIONS   Your first post-operative appointment will be 7-14 days following surgery for  wound evaluation, knee X-rays and to answer any questions you have regarding the procedure.   Typically, the first physical therapy appointment following meniscus repair is made for 1-3 days following surgery. This prescription will be communicated prior to surgery.  If you have any further questions please contact Dr. Buenrostro’s team directly.      You received a drug called Toradol which is an Anti Inflammatory at: 8am  If you are allowed to take Anti inflammatories:    Do not take any Anti Inflammatory like Motrin, Aleve or Ibuprophen until after: 2pm.  Please report any suspected allergic reactions or bleeding issues to your doctor

## 2024-10-15 ENCOUNTER — ANESTHESIA (OUTPATIENT)
Dept: SURGERY | Facility: HOSPITAL | Age: 64
End: 2024-10-15
Payer: COMMERCIAL

## 2024-10-15 ENCOUNTER — ANESTHESIA EVENT (OUTPATIENT)
Dept: SURGERY | Facility: HOSPITAL | Age: 64
End: 2024-10-15
Payer: COMMERCIAL

## 2024-10-15 ENCOUNTER — HOSPITAL ENCOUNTER (OUTPATIENT)
Facility: HOSPITAL | Age: 64
Setting detail: HOSPITAL OUTPATIENT SURGERY
Discharge: HOME | End: 2024-10-15
Attending: ORTHOPAEDIC SURGERY | Admitting: ORTHOPAEDIC SURGERY
Payer: COMMERCIAL

## 2024-10-15 VITALS
RESPIRATION RATE: 16 BRPM | TEMPERATURE: 97 F | DIASTOLIC BLOOD PRESSURE: 84 MMHG | HEIGHT: 73 IN | HEART RATE: 72 BPM | BODY MASS INDEX: 29.55 KG/M2 | OXYGEN SATURATION: 98 % | WEIGHT: 223 LBS | SYSTOLIC BLOOD PRESSURE: 139 MMHG

## 2024-10-15 DIAGNOSIS — Z48.89 AFTERCARE FOLLOWING SURGERY: Primary | ICD-10-CM

## 2024-10-15 PROCEDURE — 0SQC4ZZ REPAIR RIGHT KNEE JOINT, PERCUTANEOUS ENDOSCOPIC APPROACH: ICD-10-PCS | Performed by: ORTHOPAEDIC SURGERY

## 2024-10-15 DEVICE — NOVOSTITCH CARTRIDGE 0
Type: IMPLANTABLE DEVICE | Site: KNEE | Status: FUNCTIONAL
Brand: NOVOSTITCH

## 2024-10-15 DEVICE — NOVOSTITCH PRO MENISCAL RPR SYS 0
Type: IMPLANTABLE DEVICE | Site: KNEE | Status: FUNCTIONAL
Brand: NOVOSTITCH

## 2024-10-15 DEVICE — IMPLSYS 2NDRY FIXATN PEEKSWVLK 4.75X19.1
Type: IMPLANTABLE DEVICE | Site: KNEE | Status: FUNCTIONAL
Brand: ARTHREX®

## 2024-10-15 RX ORDER — PROCHLORPERAZINE EDISYLATE 5 MG/ML
5 INJECTION INTRAMUSCULAR; INTRAVENOUS EVERY 8 HOURS PRN
Status: DISCONTINUED | OUTPATIENT
Start: 2024-10-15 | End: 2024-10-15

## 2024-10-15 RX ORDER — METOCLOPRAMIDE HYDROCHLORIDE 5 MG/ML
INJECTION INTRAMUSCULAR; INTRAVENOUS AS NEEDED
Status: DISCONTINUED | OUTPATIENT
Start: 2024-10-15 | End: 2024-10-15 | Stop reason: SURG

## 2024-10-15 RX ORDER — DEXAMETHASONE SODIUM PHOSPHATE 4 MG/ML
VIAL (ML) INJECTION AS NEEDED
Status: DISCONTINUED | OUTPATIENT
Start: 2024-10-15 | End: 2024-10-15 | Stop reason: SURG

## 2024-10-15 RX ORDER — HYDROCODONE BITARTRATE AND ACETAMINOPHEN 5; 325 MG/1; MG/1
2 TABLET ORAL ONCE AS NEEDED
Status: DISCONTINUED | OUTPATIENT
Start: 2024-10-15 | End: 2024-10-15

## 2024-10-15 RX ORDER — DIPHENHYDRAMINE HYDROCHLORIDE 50 MG/ML
12.5 INJECTION INTRAMUSCULAR; INTRAVENOUS AS NEEDED
Status: DISCONTINUED | OUTPATIENT
Start: 2024-10-15 | End: 2024-10-15

## 2024-10-15 RX ORDER — ACETAMINOPHEN 500 MG
1000 TABLET ORAL ONCE AS NEEDED
Status: DISCONTINUED | OUTPATIENT
Start: 2024-10-15 | End: 2024-10-15

## 2024-10-15 RX ORDER — HYDROMORPHONE HYDROCHLORIDE 1 MG/ML
0.6 INJECTION, SOLUTION INTRAMUSCULAR; INTRAVENOUS; SUBCUTANEOUS EVERY 5 MIN PRN
Status: DISCONTINUED | OUTPATIENT
Start: 2024-10-15 | End: 2024-10-15

## 2024-10-15 RX ORDER — BUPIVACAINE HYDROCHLORIDE 5 MG/ML
INJECTION, SOLUTION EPIDURAL; INTRACAUDAL AS NEEDED
Status: DISCONTINUED | OUTPATIENT
Start: 2024-10-15 | End: 2024-10-15 | Stop reason: HOSPADM

## 2024-10-15 RX ORDER — HYDROMORPHONE HYDROCHLORIDE 1 MG/ML
0.4 INJECTION, SOLUTION INTRAMUSCULAR; INTRAVENOUS; SUBCUTANEOUS EVERY 5 MIN PRN
Status: DISCONTINUED | OUTPATIENT
Start: 2024-10-15 | End: 2024-10-15

## 2024-10-15 RX ORDER — LABETALOL HYDROCHLORIDE 5 MG/ML
5 INJECTION, SOLUTION INTRAVENOUS EVERY 5 MIN PRN
Status: DISCONTINUED | OUTPATIENT
Start: 2024-10-15 | End: 2024-10-15

## 2024-10-15 RX ORDER — KETOROLAC TROMETHAMINE 30 MG/ML
INJECTION, SOLUTION INTRAMUSCULAR; INTRAVENOUS AS NEEDED
Status: DISCONTINUED | OUTPATIENT
Start: 2024-10-15 | End: 2024-10-15 | Stop reason: SURG

## 2024-10-15 RX ORDER — MEPERIDINE HYDROCHLORIDE 25 MG/ML
INJECTION INTRAMUSCULAR; INTRAVENOUS; SUBCUTANEOUS
Status: COMPLETED
Start: 2024-10-15 | End: 2024-10-15

## 2024-10-15 RX ORDER — SODIUM CHLORIDE, SODIUM LACTATE, POTASSIUM CHLORIDE, CALCIUM CHLORIDE 600; 310; 30; 20 MG/100ML; MG/100ML; MG/100ML; MG/100ML
INJECTION, SOLUTION INTRAVENOUS CONTINUOUS
Status: DISCONTINUED | OUTPATIENT
Start: 2024-10-15 | End: 2024-10-15

## 2024-10-15 RX ORDER — HYDROMORPHONE HYDROCHLORIDE 1 MG/ML
0.2 INJECTION, SOLUTION INTRAMUSCULAR; INTRAVENOUS; SUBCUTANEOUS EVERY 5 MIN PRN
Status: DISCONTINUED | OUTPATIENT
Start: 2024-10-15 | End: 2024-10-15

## 2024-10-15 RX ORDER — ONDANSETRON 2 MG/ML
INJECTION INTRAMUSCULAR; INTRAVENOUS AS NEEDED
Status: DISCONTINUED | OUTPATIENT
Start: 2024-10-15 | End: 2024-10-15 | Stop reason: SURG

## 2024-10-15 RX ORDER — ONDANSETRON 2 MG/ML
4 INJECTION INTRAMUSCULAR; INTRAVENOUS EVERY 6 HOURS PRN
Status: DISCONTINUED | OUTPATIENT
Start: 2024-10-15 | End: 2024-10-15

## 2024-10-15 RX ORDER — LIDOCAINE HYDROCHLORIDE 10 MG/ML
INJECTION, SOLUTION EPIDURAL; INFILTRATION; INTRACAUDAL; PERINEURAL AS NEEDED
Status: DISCONTINUED | OUTPATIENT
Start: 2024-10-15 | End: 2024-10-15 | Stop reason: SURG

## 2024-10-15 RX ORDER — DROPERIDOL 2.5 MG/ML
INJECTION, SOLUTION INTRAMUSCULAR; INTRAVENOUS AS NEEDED
Status: DISCONTINUED | OUTPATIENT
Start: 2024-10-15 | End: 2024-10-15 | Stop reason: SURG

## 2024-10-15 RX ORDER — MEPERIDINE HYDROCHLORIDE 25 MG/ML
12.5 INJECTION INTRAMUSCULAR; INTRAVENOUS; SUBCUTANEOUS AS NEEDED
Status: DISCONTINUED | OUTPATIENT
Start: 2024-10-15 | End: 2024-10-15

## 2024-10-15 RX ORDER — PHENYLEPHRINE HCL 10 MG/ML
VIAL (ML) INJECTION AS NEEDED
Status: DISCONTINUED | OUTPATIENT
Start: 2024-10-15 | End: 2024-10-15 | Stop reason: SURG

## 2024-10-15 RX ORDER — ACETAMINOPHEN 500 MG
1000 TABLET ORAL ONCE
Status: DISCONTINUED | OUTPATIENT
Start: 2024-10-15 | End: 2024-10-15 | Stop reason: HOSPADM

## 2024-10-15 RX ORDER — SCOLOPAMINE TRANSDERMAL SYSTEM 1 MG/1
1 PATCH, EXTENDED RELEASE TRANSDERMAL ONCE
Status: DISCONTINUED | OUTPATIENT
Start: 2024-10-15 | End: 2024-10-15 | Stop reason: HOSPADM

## 2024-10-15 RX ORDER — NALOXONE HYDROCHLORIDE 0.4 MG/ML
80 INJECTION, SOLUTION INTRAMUSCULAR; INTRAVENOUS; SUBCUTANEOUS AS NEEDED
Status: DISCONTINUED | OUTPATIENT
Start: 2024-10-15 | End: 2024-10-15

## 2024-10-15 RX ORDER — HYDROCODONE BITARTRATE AND ACETAMINOPHEN 5; 325 MG/1; MG/1
1 TABLET ORAL ONCE AS NEEDED
Status: DISCONTINUED | OUTPATIENT
Start: 2024-10-15 | End: 2024-10-15

## 2024-10-15 RX ADMIN — DEXAMETHASONE SODIUM PHOSPHATE 8 MG: 4 MG/ML VIAL (ML) INJECTION at 08:00:00

## 2024-10-15 RX ADMIN — ONDANSETRON 4 MG: 2 INJECTION INTRAMUSCULAR; INTRAVENOUS at 07:09:00

## 2024-10-15 RX ADMIN — SODIUM CHLORIDE, SODIUM LACTATE, POTASSIUM CHLORIDE, CALCIUM CHLORIDE: 600; 310; 30; 20 INJECTION, SOLUTION INTRAVENOUS at 07:14:00

## 2024-10-15 RX ADMIN — DROPERIDOL 0.62 MG: 2.5 INJECTION, SOLUTION INTRAMUSCULAR; INTRAVENOUS at 07:09:00

## 2024-10-15 RX ADMIN — KETOROLAC TROMETHAMINE 30 MG: 30 INJECTION, SOLUTION INTRAMUSCULAR; INTRAVENOUS at 07:58:00

## 2024-10-15 RX ADMIN — SODIUM CHLORIDE, SODIUM LACTATE, POTASSIUM CHLORIDE, CALCIUM CHLORIDE: 600; 310; 30; 20 INJECTION, SOLUTION INTRAVENOUS at 07:07:00

## 2024-10-15 RX ADMIN — LIDOCAINE HYDROCHLORIDE 100 MG: 10 INJECTION, SOLUTION EPIDURAL; INFILTRATION; INTRACAUDAL; PERINEURAL at 07:17:00

## 2024-10-15 RX ADMIN — METOCLOPRAMIDE HYDROCHLORIDE 10 MG: 5 INJECTION INTRAMUSCULAR; INTRAVENOUS at 07:09:00

## 2024-10-15 RX ADMIN — PHENYLEPHRINE HCL 100 MCG: 10 MG/ML VIAL (ML) INJECTION at 08:05:00

## 2024-10-15 NOTE — ANESTHESIA PROCEDURE NOTES
Airway  Date/Time: 10/15/2024 7:17 AM  Urgency: elective      General Information and Staff    Patient location during procedure: OR  Anesthesiologist: Mukund Gambino MD  Performed: anesthesiologist   Performed by: Mukund Gambino MD  Authorized by: Mukund Gambino MD      Indications and Patient Condition  Indications for airway management: anesthesia  Spontaneous Ventilation: absent  Sedation level: deep  Preoxygenated: yes  Patient position: sniffing  Mask difficulty assessment: 1 - vent by mask    Final Airway Details  Final airway type: supraglottic airway      Successful airway: classic  Size 4       Number of attempts at approach: 1  Number of other approaches attempted: 0

## 2024-10-15 NOTE — H&P
The below referenced H&P was reviewed by Rhona Buenrostro MD, the patient was examined and no significant changes have occurred in the patient's condition since the H&P was performed.  I discussed with the patient and/or legal representative the potential benefits, risks and side effects of this procedure; the likelihood of the patient achieving goals; and potential problems that might occur during recuperation.  I discussed reasonable alternatives to the procedure, including risks, benefits and side effects related to the alternatives and risks related to not receiving this procedure.  We will proceed with procedure as planned.           Orthopaedic Surgery  [address] [place] [id number]  [phone]     PRE SURGICAL - HISTORY AND PHYSICAL EXAMINATION     Name: Zachariah Rivera   MRN: [id number]  CC: Right Knee Pain and mechanical symptoms    REFERRED BY: Drew Horne MD    HPI:   Zachariah Rivera is a very pleasant 64 year old male who presents today for evaluation of Right knee pain and mechanical symptoms and recent completion of MRI demonstrating meniscal pathology.     To summarize: right knee pain since early July 2024, initially waking up with unexplained discomfort. Recently, while attending his son's wedding, he twisted his knee while descending stairs on a bus and landed on the affected knee. Following the incident, he was evaluated by Dr. Horne and obtained a MRI. Current symptoms include pain rated at 5/10, accompanied by stiffness, swelling, and weakness, all localized underneath the kneecap, with progressive worsening over time.     He enjoys golfing.    PMH:   Past Medical History:    Calculus of kidney    Enlarged lymph node    Essential hypertension    Hearing impaired person, bilateral    bilateral hearing aids    High cholesterol    Sarcoidosis    Visual impairment    Glasses       PAST SURGICAL HX:  Past Surgical History:   Procedure Laterality Date    Colonoscopy       Tonsillectomy         FAMILY HX:  Family History   Problem Relation Age of Onset    Cancer Father         Lung    Lipids Mother     Heart Disorder Paternal Grandmother        ALLERGIES:  Pcn [penicillins]    MEDICATIONS:   Current Outpatient Medications   Medication Sig Dispense Refill    Acetaminophen 500 MG Oral Cap Take 2 capsules (1,000 mg total) by mouth every 6 (six) hours for 14 days. 50 capsule 1    traMADol 50 MG Oral Tab Please take 1 tablet every 6 hours as needed for pain. Max of 4 tablets per day. Collaborating - Dr. Rhona Buenrostro. 20 tablet 1    Sennosides-Docusate Sodium 8.6-50 MG Oral Cap Take 1 capsule by mouth daily as needed. 30 capsule 1    ondansetron (ZOFRAN) 4 mg tablet Take 1 tablet by mouth every 8 hours as needed for nausea. 8 tablet 0    aspirin (ASPIRIN 81) 81 MG Oral Chew Tab Chew 1 tablet (81 mg total) by mouth daily. 30 tablet 0       ROS: A comprehensive 14 point review of systems was performed and was negative aside from the aforementioned per history of present illness.    SOCIAL HX:  Social History     Tobacco Use    Smoking status: Never    Smokeless tobacco: Never   Substance Use Topics    Alcohol use: Yes     Alcohol/week: 1.0 standard drink of alcohol     Types: 1 Cans of beer per week     Comment: Occasional       PE:   Vitals:    09/30/24 1641 10/15/24 0553   BP:  145/90   BP Location:  Right arm   Pulse:  64   Resp:  16   Temp:  98.1 °F (36.7 °C)   TempSrc:  Temporal   SpO2:  96%   Weight: 219 lb (99.3 kg) 223 lb (101.2 kg)   Height: 6' 1\" (1.854 m)      Estimated body mass index is 29.42 kg/m² as calculated from the following:    Height as of this encounter: 6' 1\" (1.854 m).    Weight as of this encounter: 223 lb (101.2 kg).    Physical Exam  Constitutional:       Appearance: Normal appearance.   HENT:      Head: Normocephalic and atraumatic.   Eyes:      Extraocular Movements: Extraocular movements intact.   Neck:      Musculoskeletal: Normal range of motion and neck supple.    Cardiovascular:      Pulses: Normal pulses.   Pulmonary:      Effort: Pulmonary effort is normal. No respiratory distress.   Abdominal:      General: There is no distension.   Skin:     General: Skin is warm.      Capillary Refill: Capillary refill takes less than 2 seconds.      Findings: No bruising.   Neurological:      General: No focal deficit present.      Mental Status: Alert.   Psychiatric:         Mood and Affect: Mood normal.     Examination of the right knee demonstrates:     Skin is intact, warm and dry.   Atrophy: none    Effusion: small    Joint line tenderness: medial  Crepitation: none   Roxy: Positive   Patellar mobility: normal without apprehension  J-sign: none    ROM: Extension lacking less than 5 degrees  Flexion 120 degrees  ACL:  Negative Lachman, Negative Pivot Shift   PCL:  Negative Posterior Drawer  Collateral Ligaments: Stable to Varus and Valgus stress at 0 and 30 degrees  Strength: mild weakness   Hip joint: normal pain-free ROM   Gait:  mildly antalgic   Leg length: equal and symmetric  Alignment:  neutral     No obvious peripheral edema noted.   Distal neurovascular exam demonstrates normal perfusion, intact sensation to light touch and full strength.     Examination of the contralateral knee demonstrates:  No significant atrophy, swelling or effusion. Full range of motion. Neurovascularly intact distally.    Radiographic Examination/Diagnostics:  XR and MRI of the knee personally viewed, independently interpreted and radiology report was reviewed.    emmanuel Jorgensen MRI KNEE, RIGHT (OKB=83902)    Result Date: 8/9/2024  Exam: MRI KNEE RT W/O CONTRAST CPT Code(s): 22825 - MRI JNT OF LWR EXTRE W/O DYE EXAM: MRI right knee without contrast 8/9/2024. COMPARISON:  No prior images or imaging reports are available for comparison at the time of this dictation. INDICATION:  Acute pain of right knee. Internal derangement of right knee. Swelling. Limited range of motion. Right knee pain. Recent  reinjury. TECHNIQUE:  . Multiple and multisequence MR images of the right knee were acquired on a wide bore ultra high field 3.0 T Siemens Skyra MR scanner without intravenous contrast. FINDINGS:  The anterior and posterior cruciate ligaments, as well as the fibular collateral ligament appear intact. Mild periligamentous edema along the medial collateral ligament suggests mild sprain. Small joint effusion. No discrete loose body is identified. The quadriceps, patellar, and biceps femoris tendons appear intact. Mild edema within the popliteus and semimembranosus tendons is compatible tendinosis. Small Baker's cyst. The distal iliotibial band appears intact. No acute fracture. No significant localized fatty atrophy of the visualized musculature. Neurovascular structures appear grossly intact. Mild subcutaneous edema soft tissue edema is more pronounced along the anterior knee. In the medial compartment, there is mild to moderate cartilage loss, more pronounced at the mid weightbearing medial femoral condyle. Small marginal osteophytes. The body of the medial meniscus is slightly extruded, with and nonspecific intrasubstance signal abnormality suggesting degeneration, with fraying of the free edge. Irregularity and indistinctness compatible with tear of the posterior root attachment of the medial meniscus. In the lateral compartment, there is a 3 x 8 mm intermediate to high-grade chondral defect at the mid to posterior weightbearing lateral femoral condyle. Mild the heterogeneous signal intensity of the articular cartilage of the lateral tibial plateau. Mild heterogeneity and slightly attenuated appearance of the of the posterior root of the lateral meniscus may represent or degeneration and/or fraying, with tear difficult to exclude. In the patellofemoral compartment, there is mild to moderate cartilage loss of the patellar apex, more pronounced at the upper pole, where there is mild subchondral marrow edema. Moderate  to severe cartilage loss of the medial patellar facet. Tiny marginal osteophytes.   IMPRESSION:   1. Mild medial collateral ligament sprain.   2. Tear of the posterior root of the medial meniscus, with degeneration and fraying of the body. Degeneration and fraying of the posterior root of the lateral meniscus, with tear difficult to exclude.   3. Mild popliteus and semimembranosus tendinosis with a small Baker's cyst.   4. Mild osteoarthritic changes, with cartilage loss more pronounced from the patellar apex to medial patellar facet, as well as at the medial femoral condyle. 3 x 8 mm intermediate to high-grade chondral defect at the lateral femoral condyle.   5. Small joint effusion.   This report was performed utilizing speech recognition software technology. Despite thorough proofreading, speech recognition errors could escape detection. If a word or phrase is confusing or out of context, please do not hesitate to call for clarification. Interpreting Radiologist: Jackie Stoner M.D. Electronically Signed: 08/09/2024 06:44 PM    XR KNEE (3 VIEWS), RIGHT (CPT=73562)    Result Date: 8/4/2024  PROCEDURE:  XR KNEE ROUTINE (3 VIEWS), RIGHT (CPT=73562)  TECHNIQUE:  Three views were obtained including patellar view.  COMPARISON:  None.  INDICATIONS:  Right knee pain  PATIENT STATED HISTORY: (As transcribed by Technologist)  Patient states he twisted his right knee and now cannot walk well/bear down weight.     FINDINGS:  No acute fractures or osseous lesions.  Patellar femoral relationship is within normal limits.  Small suprapatellar  joint effusion.  Mild osteoarthritic changes.            CONCLUSION:  No acute fractures.  Mild osteoarthritic changes.   LOCATION:  [facility]   Dictated by (CST): Zeyad Littlejohn MD on 8/04/2024 at 1:23 PM     Finalized by (ANGI): Zeyad Littlejohn MD on 8/04/2024 at 1:23 PM         IMPRESSION: Zachariah Rivera is a 64 year old male with right medial meniscus root tear, chondromalacia and  synovitis.  They have failed extensive conservative treatment including oral anti-inflammatory medications and activity modification.     Consequently, they are an appropriate candidate for knee arthroscopy and medial mensicus root repair. This will be augmented with microfracture of notch.     PLAN:   I had a lengthy discussion with Zachariah about the diagnosis and options, both surgical and nonsurgical. I have recommended that we proceed with arthroscopic surgical treatment as we agree that this intervention will likely offer the best opportunity for symptomatic relief and functional recovery. I used the MRI scan and a 3-dimensional knee model to outline his pathology, as well as general surgical principles. We reviewed the risks associated with arthroscopic meniscus repair.  In particular I emphasized the limited vascularity of the meniscus and potential for incomplete healing, although this is the preferred treatment.  Simultaneously, I will do a diagnostic knee arthroscopy of the whole knee and hope to identify and address any other pathology that may be encountered such as scar tissue, inflammation, cartilage pathology.  In particular we discussed risks that include, a low risk of infection (<1%), potential transient or permanent injury to nerves with superficial numbness, joint stiffness which may require additional physical therapy, persistent pain/lack of symptomatic improvement, need for future operation, wound complications (rare as incisions are very small), deep vein thrombosis (DVT) and pulmonary embolism (PE).   We discussed the proposed rehabilitation timeline as well as expected postoperative restrictions.  In this regard, I emphasized that there will be weightbearing restrictions after surgery and requirement for crutch utilization ranging from 4 to 6 weeks.  This allows for protecting the meniscus from additional loads in the process of healing.  Physical therapy will still be initiated immediately  after surgery to maintain muscle tone and gradually progress with regards to range of motion.  Zachariah voiced a good understanding of treatment options, risks and benefits, postoperative instructions, rehabilitation timeline, and restrictions. He was given the opportunity to ask questions, which were all answered to the best of my ability and to his satisfaction. Zachariah will work with my office to arrange a time for surgery and obtain any medical clearance information necessary. My pre-operative information packet, which details the process and answers many FAQ's will be provided. He was encouraged to call the office with any further questions or concerns.   I spent 60 minutes in preparation to see the patient, counseling/education of relevant pathology, discussing imaging results, ordering post surgical physical therapy intervention, surgical counseling, and care coordination.        FOLLOW-UP:  Post-Operative Visit, POD 6 with Laurie Lock PA-C.       Rhona Buenrostro MD  Knee, Shoulder, & Elbow Surgery / Sports Medicine Specialist  Orthopaedic Surgery  [address] [place] [address].[facility]  [email or web address]  t: [phone]  o: [phone]  f: [phone]    This note was dictated using Dragon software.  While it was briefly proofread prior to completion, some grammatical, spelling, and word choice errors due to dictation may still occur.

## 2024-10-15 NOTE — ANESTHESIA POSTPROCEDURE EVALUATION
[facility]    Zachariah Rivera Patient Status:  Hospital Outpatient Surgery   Age/Gender 64 year old male MRN [id number]   Location [facility] POST ANESTHESIA CARE UNIT Attending Rhona Buenrostro MD   Hosp Day # 0 PCP Drew Horne MD       Anesthesia Post-op Note    Right Knee Arthroscopy Medial Meniscus Root Repair Synovectomy Microfracture of Notch    Procedure Summary       Date: 10/15/24 Room / Location:  MAIN OR 14 / [facility] MAIN OR    Anesthesia Start: 0707 Anesthesia Stop: 0821    Procedure: Right Knee Arthroscopy Medial Meniscus Root Repair Synovectomy Microfracture of Notch (Right: Knee) Diagnosis:       Other tear of medial meniscus of right knee as current injury, initial encounter      (Other tear of medial meniscus of right knee as current injury, initial encounter [S83.241A])    Surgeons: Rhona Buenrostro MD Anesthesiologist: Mukund Gambino MD    Anesthesia Type: general ASA Status: 2            Anesthesia Type: general    Vitals Value Taken Time   /80 10/15/24 0831   Temp 98.3 °F (36.8 °C) 10/15/24 0835   Pulse 63 10/15/24 0835   Resp 17 10/15/24 0835   SpO2 100 % 10/15/24 0835       Patient Location: PACU    Anesthesia Type: general    Airway Patency: patent    Postop Pain Control: adequate    Mental Status: preanesthetic baseline    Nausea/Vomiting: none    Cardiopulmonary/Hydration status: stable euvolemic    Complications: no apparent anesthesia related complications    Postop vital signs: stable    Dental Exam: Unchanged from Preop    Patient to be discharged from PACU when criteria met.

## 2024-10-15 NOTE — ANESTHESIA PREPROCEDURE EVALUATION
PRE-OP EVALUATION    Patient Name: Zachariah Rivera    Admit Diagnosis: Other tear of medial meniscus of right knee as current injury, initial encounter [S83.241A]    Pre-op Diagnosis: Other tear of medial meniscus of right knee as current injury, initial encounter [S83.241A]    Right Knee Arthroscopy Medial Meniscus Root Repair Synovectomy Microfracture of Notch    Anesthesia Procedure: Right Knee Arthroscopy Medial Meniscus Root Repair Synovectomy Microfracture of Notch (Right)    Surgeons and Role:     * Rhona Buenrostro MD - Primary    Pre-op vitals reviewed.  Temp: 98.1 °F (36.7 °C)  Pulse: 64  Resp: 16  BP: 145/90  SpO2: 96 %  Body mass index is 29.42 kg/m².    Current medications reviewed.  Hospital Medications:   [Transfer Hold] acetaminophen (Tylenol Extra Strength) tab 1,000 mg  1,000 mg Oral Once    [Transfer Hold] scopolamine (Transderm-Scop) 1 MG/3DAYS patch 1 patch  1 patch Transdermal Once    lactated ringers infusion   Intravenous Continuous    [COMPLETED] ceFAZolin (Ancef) 2g in 10mL IV syringe premix  2 g Intravenous Once       Outpatient Medications:   Prescriptions Prior to Admission[1]    Allergies: Pcn [penicillins]      Anesthesia Evaluation    Patient summary reviewed.    Anesthetic Complications  (-) history of anesthetic complications         GI/Hepatic/Renal    Negative GI/hepatic/renal ROS.  (-) GERD                           Cardiovascular        Exercise tolerance: good     MET: >4      (+) hypertension                       (-) angina     (-) SUAREZ  (-) orthopnea  (-) PND     Endo/Other    Negative endo/other ROS.                              Pulmonary    Negative pulmonary ROS.           (-) shortness of breath  (-) recent URI          Neuro/Psych                   (+) headaches                   Past Surgical History:   Procedure Laterality Date    Colonoscopy      Tonsillectomy       Social History     Socioeconomic History    Marital status:    Tobacco Use    Smoking  status: Never    Smokeless tobacco: Never   Vaping Use    Vaping status: Never Used   Substance and Sexual Activity    Alcohol use: Yes     Alcohol/week: 1.0 standard drink of alcohol     Types: 1 Cans of beer per week     Comment: Occasional    Drug use: No     History   Drug Use No     Available pre-op labs reviewed.     Lab Results   Component Value Date     10/02/2024    K 3.7 10/02/2024     10/02/2024    CO2 28.0 10/02/2024    BUN 12 10/02/2024    CREATSERUM 0.99 10/02/2024     (H) 10/02/2024    CA 9.6 10/02/2024            Airway      Mallampati: II  Mouth opening: >3 FB  TM distance: > 6 cm  Neck ROM: full Cardiovascular    Cardiovascular exam normal.  Rhythm: regular  Rate: normal  (-) murmur   Dental    Dentition appears grossly intact         Pulmonary    Pulmonary exam normal.  Breath sounds clear to auscultation bilaterally.        (-) wheezes       Other findings              ASA: 2   Plan: general  NPO status verified and patient meets guidelines.  Patient has not taken beta blockers in last 24 hours.        Plan/risks discussed with: patient            We discussed GA w/LMA or ETT and possible scratchy throat and rarely dental damage.  We discussed analgesic plan and PONV prophylaxis.  The patient's questions were answered and consent was attained.          [1]   Medications Prior to Admission   Medication Sig Dispense Refill Last Dose/Taking    amLODIPine 5 MG Oral Tab Take 1 tablet (5 mg total) by mouth daily. 90 tablet 3 10/15/2024 at  5:30 AM    atorvastatin 20 MG Oral Tab Take 1 tablet (20 mg total) by mouth nightly. 90 tablet 1 10/14/2024 at  9:00 PM    Sildenafil Citrate 100 MG Oral Tab Take 1 tablet (100 mg total) by mouth daily as needed for Erectile Dysfunction. 30 tablet 3 Taking As Needed    Fluticasone Propionate (FLONASE) 50 MCG/ACT Nasal Suspension 2 sprays by Each Nare route daily. 1 Bottle 0 10/14/2024 at  8:00 AM    Multiple Vitamins-Minerals (MULTIVITAMIN OR) Take   by mouth.   Past Month    [] naproxen 500 MG Oral Tab Take 1 tablet (500 mg total) by mouth 2 (two) times daily as needed. 20 tablet 0 More than a month

## 2024-10-15 NOTE — OPERATIVE REPORT
OPERATIVE RECORD  ATTENDING SURGEON: CLIFF FOSTER MD  ASSISTANT(S): Chico Waggoner (AJ)  STATEMENT OF MEDICAL NECESSITY  The aid of physician assistant Chico Waggoner (AJ) was needed for patient positioning, preparation, drape, retraction,  wound closure, dressing application and critical portions of the procedure.   PRELIMINARY DIAGNOSIS:  1.  Right Medial Meniscus Root Tear  2.  Right Patellofemoral Chondromalacia     POSTOPERATIVE DIAGNOSIS:  1.  Right Medial Meniscus Root Tear  2.  Right Patellofemoral Chondromalacia    THE OPERATION:  1.  Right Knee Arthroscopy, Medial Meniscus Root Repair (01778)   2.  Right Femoral Intercondylar Notch Microfracture (06780)  3.  Right Knee Arthroscopic Synovectomy (42961)     ANESTHESIA: General Endotracheal  ANESTHESIOLOGIST:  MD Immanuel  ANTIBIOTICS: Cefazolin 2g within 60 minutes of surgical incision.    IMPLANTS:   None  PATHOLOGY/CULTURES: None  ESTIMATED BLOOD LOSS: Blood Output: 10 mL (10/15/2024  7:55 AM)    DRAINS: None  COMPLICATIONS: No intraoperative nor immediate postoperative complications noted.  COUNTS: All sponge and needle counts were correct at the conclusion of the procedure.  TOURNIQUET TIME: Not Applicable  OPERATIVE FINDINGS:  Successful repair of medial meniscus root by bone tunnel transtibial.  Secured via swivel lock at the anterior tibial surface.  Confirmed to be successfully repaired.  Mild to moderate synovitis in the medial, lateral patellofemoral compartments managed with synovectomy.  Debridement of articular cartilage in the patellofemoral and lateral compartments distally managed stable margin for mild chondromalacia.    Indications:  Zachariah is a 64 year old male with history, physical examination, and imaging findings consistent with medial meniscus root pathology.  Given well-maintained structural integrity and chondral surfaces within the knee, this merits surgical repair to prevent degenerative changes in the future.  Operative and  nonoperative options were discussed with him in my office and we ultimately agreed that surgery would provide the highest likelihood of symptomatic relief and functional improvement.  The risks and benefits of surgery as well as the postoperative rehabilitation plan were reviewed. He voiced a good understanding of treatment options, risks and benefits, and alternatives to surgery. He was given the opportunity to ask questions, which were all answered to the best of my ability and to his satisfaction. Zachariah wished to proceed.   On the day of surgery, the Zachariah was seen in the preoperative area.  I confirmed his identity by name and birthday. We reviewed and confirmed the surgical consent including laterality.  The surgical site was marked with my initials.  We re-reviewed the risks and benefits of the procedure and I answered all additional questions to his satisfaction.      OPERATIVE REPORT:   Zachariah was taken to the operating room in stable condition.    A clear 1010 drape x 2 was applied to the upper thigh. An adjustable lateral post was utilized. The extremity underwent a prescrub with chlorhexidine and alcohol followed by a chlorhexidine formal preparation.  A preoperative timeout was performed confirming the correct surgical site, procedure, and preoperative imaging was reviewed.      Exam under anesthesia demonstrated a Stable knee with normal range of motion.      Diagnostic knee arthroscopy was performed with standard anterolateral visualization portal was made utilizing a 15 blade, and an arthroscope was introduced. The medial working portal was established under spinal needle localization and diagnostic arthroscopy was commenced.      Diagnostic arthroscopy revealed:      Suprapatellar No evidence of loose bodies   Patellofemoral Grade 2 articular cartilage wear on central patella, managed with debridement to a stable margin with coablation.    Gutters Clear without evidence of loose bodies or  osteophytes.   Lateral compartment Grade 2 tibial cartilage  Grade 1 femoral cartilage  Lateral meniscus intact without tearing, slight fraying of the posterior root area.   Medial compartment Grade 2 tibial cartilage  Grade 2-3 femoral cartilage   Medial meniscus tearing involving Zone A at the posterior root bone junction. Successfully repaired to stable margin with 4 strand construct.   No pathologic plica   Ligaments ACL Intact.   PCL intact  Popliteus intact    Other Mild to moderate synovitis involving medial, lateral and patellofemoral compartments managed with synovectomy to a stable margin.  Using 4.0 mm mechanical shaver and Coblation device.     Within the medial compartment, determination was made whether to involve spinal needle to open up the medial compartment and perform a partial stretching of the superficial medial collateral ligament.  In this case this was not employed.    I utilized a curved 4.0 mm mechanical shaver and curette to debride and prepared the surface of the tibia for repair of the medial meniscus root.  This was then secured with a point-to-point tibial guide which was placed at the footprint of the medial meniscus root and transtibial drilling was performed utilizing a small open incision anteriorly overlying the medial tibia.  This allowed for appropriate drill and preparation of the tibial tunnel.    A [employer] stitch device was utilized to shuttle multiple sutures to the posterior horn of the medial meniscus.  These were then shuttled through the tibial tunnel and secured to the anterior aspect of the tibia to facilitate adequate repair of the medial meniscus root.  A 4.75 mm of a lock anchor was utilized to perform this fixation.  Within the patellofemoral compartment, a 4.0 mm curved mechanical shaver was used to debride chondral surfaces of the patella and trochlea to stable margin.    I utilized a microfracture awl to perform 3 microfracture fenestrations in the  intercondylar notch to access bone marrow cavity and release marrow elements.  Adequate hemostasis were noted.  Wound closure was performed with buried 3-0 monocryl and overlying Dermabond and steri strips were applied.  4 x 4 and Tegaderm, Rimforest style dressing was applied.  The knee was wrapped in a 6 inch Ace wrap.   The final count prior to closure was correct. The patient tolerated the procedure well without complications.     Zachariah was taken to the recovery room in a stable condition with plan for ambulatory discharge to home. He was provided my postoperative discharge booklet, appropriate home exercises, script for outpatient physical therapy, and a copy of my rehabilitation guidelines.      POST OPERATIVE PLAN:  Activity Precautions: Toe-touch weightbearing x4 weeks, 0 to 90 degrees restricted range of motion for the first 4 weeks followed by gradual progression.  DVT Prophylaxis: Aspirin 81 mg daily for 30 days.  Follow-up Visit: POD #6-8      Rhona Buenrostro MD  Knee, Shoulder, & Elbow Surgery / Sports Medicine Specialist  Orthopaedic Surgery  [address] [place] [address].[facility]  [email or web address]  t: [phone]  o: [phone]  f: [phone]    This note was dictated using Dragon software.  While it was briefly proofread prior to completion, some grammatical, spelling, and word choice errors due to dictation may still occur.The patient was positioned in the supine position. They subsequently underwent standard prep and drape.

## 2024-10-21 ENCOUNTER — OFFICE VISIT (OUTPATIENT)
Dept: ORTHOPEDICS CLINIC | Facility: CLINIC | Age: 64
End: 2024-10-21
Payer: COMMERCIAL

## 2024-10-21 DIAGNOSIS — Z98.890 S/P ARTHROSCOPY OF KNEE: Primary | ICD-10-CM

## 2024-10-21 PROCEDURE — 99024 POSTOP FOLLOW-UP VISIT: CPT | Performed by: PHYSICIAN ASSISTANT

## 2024-10-21 NOTE — PROGRESS NOTES
[facility]  [address] [place] [address]  [phone]       Name: Zachariah Rivera   MRN: [id number]  Date: 10/21/2024     REASON FOR VISIT: First Post-Surgical Visit   Surgery: Right knee scope, medial meniscus root repair on 10/15/2024.     INTERVAL HISTORY:  Zachariah Rivera is a 64 year old male who returns after the aforementioned procedure.  The post-operative course has been unremarkable with pain well controlled and overall progress noted.     Physical therapy was started and is progressing well.  The patient denies any calf pain or tenderness, fevers, chills, sweats or signs of active infection. The patient has been compliant with the postoperative protocol, and admits to normal bowel and bladder function. No acute issues.     ROS: ROS    PE:   There were no vitals filed for this visit.  Estimated body mass index is 29.42 kg/m² as calculated from the following:    Height as of 9/30/24: 6' 1\" (1.854 m).    Weight as of 10/15/24: 223 lb (101.2 kg).    Physical Exam  Constitutional:       Appearance: Normal appearance.   HENT:      Head: Normocephalic and atraumatic.   Eyes:      Extraocular Movements: Extraocular movements intact.   Neck:      Musculoskeletal: Normal range of motion and neck supple.   Cardiovascular:      Pulses: Normal pulses.   Pulmonary:      Effort: Pulmonary effort is normal. No respiratory distress.   Abdominal:      General: There is no distension.   Skin:     General: Skin is warm.      Capillary Refill: Capillary refill takes less than 2 seconds.      Findings: No bruising.   Neurological:      General: No focal deficit present.      Mental Status: She is alert.   Psychiatric:         Mood and Affect: Mood normal.     Examination of the right knee demonstrates:     Physical examination the patient is alert and oriented x3, well-developed, well-nourished, no acute distress.     Tegaderm dressings were removed, and Steri-Strips  were maintained and kept in place.  Incisional sites are clean dry intact without signs of active pathology.  Calf is soft and nontender to palpation.    The contralateral knee is without limitation in range of motion or strength, no positive provocative maneuvers.       IMPRESSION: Zachariah Rivera is a 64 year old male who presents 6 day s/p Right knee scope, medial meniscus root repair on 10/15/2024.     PLAN:   We had a lengthy discussion with the patient regarding the patient's findings consistent with the expected postoperative course. We recommend continuation of physical therapy with rehabilitation efforts focused on strengthening, range of motion, functional ability, and return to baseline activity. The patient can continue to progress per protocol.    All questions were answered appropriately and the patient was in agreement with the treatment plan.       FOLLOW-UP:  Return to clinic in four weeks. No imaging required at next visit.             Laurie Lock [facility], PA-C Orthopedic Surgery / Sports Medicine Specialist  [facility]  [address] [place] [address].org  [email or web address]  t: [phone]  o: [phone]  f: [phone]    This note was dictated using Dragon software.  While it was briefly proofread prior to completion, some grammatical, spelling, and word choice errors due to dictation may still occur.

## 2024-11-18 ENCOUNTER — OFFICE VISIT (OUTPATIENT)
Dept: ORTHOPEDICS CLINIC | Facility: CLINIC | Age: 64
End: 2024-11-18
Payer: COMMERCIAL

## 2024-11-18 DIAGNOSIS — Z98.890 S/P ARTHROSCOPY OF KNEE: Primary | ICD-10-CM

## 2024-11-18 PROCEDURE — 99024 POSTOP FOLLOW-UP VISIT: CPT | Performed by: PHYSICIAN ASSISTANT

## 2024-11-18 NOTE — PROGRESS NOTES
[facility]  [address] [place] [address]  [phone]       Name: Zachariah Rivera   MRN: [id number]  Date: 11/18/2024     REASON FOR VISIT: Second Post-Surgical Visit   Surgery: Right knee scope, medial meniscus root repair on 10/15/2024.     INTERVAL HISTORY:  Zachariah Rivera is a 64 year old male who returns after the aforementioned procedure.  The post-operative course has been unremarkable with pain well controlled and overall progress noted.     Physical therapy was started and is progressing well.  No acute issues. 0/10.     ROS: ROS    PE:   There were no vitals filed for this visit.  Estimated body mass index is 29.42 kg/m² as calculated from the following:    Height as of 9/30/24: 6' 1\" (1.854 m).    Weight as of 10/15/24: 223 lb (101.2 kg).    Physical Exam  Constitutional:       Appearance: Normal appearance.   HENT:      Head: Normocephalic and atraumatic.   Eyes:      Extraocular Movements: Extraocular movements intact.   Neck:      Musculoskeletal: Normal range of motion and neck supple.   Cardiovascular:      Pulses: Normal pulses.   Pulmonary:      Effort: Pulmonary effort is normal. No respiratory distress.   Abdominal:      General: There is no distension.   Skin:     General: Skin is warm.      Capillary Refill: Capillary refill takes less than 2 seconds.      Findings: No bruising.   Neurological:      General: No focal deficit present.      Mental Status: She is alert.   Psychiatric:         Mood and Affect: Mood normal.     Examination of the right knee demonstrates:     Physical examination the patient is alert and oriented x3, well-developed, well-nourished, no acute distress.     125 in PT, and full extension. Small effusion.     Incisional sites are clean dry intact without signs of active pathology.  Calf is soft and nontender to palpation.    The contralateral knee is without limitation in range of motion or strength, no  positive provocative maneuvers.       IMPRESSION: Zachariah Rivera is a 64 year old male who presents 5 weeks s/p Right knee scope, medial meniscus root repair on 10/15/2024.     PLAN:   We had a lengthy discussion with the patient regarding the patient's findings consistent with the expected postoperative course. We recommend continuation of physical therapy with rehabilitation efforts focused on strengthening, range of motion, functional ability, and return to baseline activity. The patient can continue to progress per protocol.    All questions were answered appropriately and the patient was in agreement with the treatment plan.       FOLLOW-UP:  Return to clinic in eight weeks. No imaging required at next visit.             Sincer AMY Lock, [facility], PA-C Orthopedic Surgery / Sports Medicine Specialist  [facility]  [address] [place] [address].org  [email or web address]  t: [phone]  o: [phone]  f: [phone]    This note was dictated using Dragon software.  While it was briefly proofread prior to completion, some grammatical, spelling, and word choice errors due to dictation may still occur.

## 2024-12-11 ENCOUNTER — MED REC SCAN ONLY (OUTPATIENT)
Facility: CLINIC | Age: 64
End: 2024-12-11

## 2025-01-02 DIAGNOSIS — E78.00 HYPERCHOLESTEREMIA: ICD-10-CM

## 2025-01-03 DIAGNOSIS — E78.00 HYPERCHOLESTEREMIA: ICD-10-CM

## 2025-01-06 ENCOUNTER — PATIENT MESSAGE (OUTPATIENT)
Dept: INTERNAL MEDICINE CLINIC | Facility: CLINIC | Age: 65
End: 2025-01-06

## 2025-01-07 DIAGNOSIS — E78.00 HYPERCHOLESTEREMIA: ICD-10-CM

## 2025-01-07 RX ORDER — ATORVASTATIN CALCIUM 20 MG/1
20 TABLET, FILM COATED ORAL NIGHTLY
Qty: 90 TABLET | Refills: 1 | OUTPATIENT
Start: 2025-01-07

## 2025-01-07 RX ORDER — ATORVASTATIN CALCIUM 20 MG/1
20 TABLET, FILM COATED ORAL NIGHTLY
Qty: 90 TABLET | Refills: 1 | Status: SHIPPED | OUTPATIENT
Start: 2025-01-07

## 2025-01-07 NOTE — TELEPHONE ENCOUNTER
Refilled per protocol     Cholesterol Medication Protocol Passed   atorvastatin 20 MG Oral Tab  1/3/2025 10:07 PM    ALT < 80    ALT resulted within past year    Lipid panel within past 12 months    In person appointment or virtual visit in the past 12 mos or appointment in next 3 mos

## 2025-01-13 ENCOUNTER — TELEPHONE (OUTPATIENT)
Dept: INTERNAL MEDICINE CLINIC | Facility: CLINIC | Age: 65
End: 2025-01-13

## 2025-01-13 ENCOUNTER — OFFICE VISIT (OUTPATIENT)
Dept: ORTHOPEDICS CLINIC | Facility: CLINIC | Age: 65
End: 2025-01-13
Payer: COMMERCIAL

## 2025-01-13 DIAGNOSIS — Z12.5 SCREENING FOR MALIGNANT NEOPLASM OF PROSTATE: ICD-10-CM

## 2025-01-13 DIAGNOSIS — I10 ESSENTIAL HYPERTENSION: ICD-10-CM

## 2025-01-13 DIAGNOSIS — Z98.890 S/P ARTHROSCOPY OF KNEE: Primary | ICD-10-CM

## 2025-01-13 DIAGNOSIS — Z00.00 ROUTINE GENERAL MEDICAL EXAMINATION AT A HEALTH CARE FACILITY: Primary | ICD-10-CM

## 2025-01-13 PROCEDURE — 99024 POSTOP FOLLOW-UP VISIT: CPT | Performed by: PHYSICIAN ASSISTANT

## 2025-01-13 NOTE — PROGRESS NOTES
[facility]  [address] [place] [address]  [phone]       Name: Zachariah Rivera   MRN: [id number]  Date: 1/13/2025     REASON FOR VISIT: Third Post-Surgical Visit   Surgery: Right knee scope, medial meniscus root repair on 10/15/2024.     INTERVAL HISTORY:  Zachariah Rivera is a 64 year old male who returns after the aforementioned procedure.  The post-operative course has been unremarkable with pain well controlled and overall progress noted.     Physical therapy was started and is progressing well. His functional goals are to return to golf. 1/10 pain.     ROS: ROS    PE:   There were no vitals filed for this visit.  Estimated body mass index is 29.42 kg/m² as calculated from the following:    Height as of 9/30/24: 6' 1\" (1.854 m).    Weight as of 10/15/24: 223 lb (101.2 kg).    Physical Exam  Constitutional:       Appearance: Normal appearance.   HENT:      Head: Normocephalic and atraumatic.   Eyes:      Extraocular Movements: Extraocular movements intact.   Neck:      Musculoskeletal: Normal range of motion and neck supple.   Cardiovascular:      Pulses: Normal pulses.   Pulmonary:      Effort: Pulmonary effort is normal. No respiratory distress.   Abdominal:      General: There is no distension.   Skin:     General: Skin is warm.      Capillary Refill: Capillary refill takes less than 2 seconds.      Findings: No bruising.   Neurological:      General: No focal deficit present.      Mental Status: She is alert.   Psychiatric:         Mood and Affect: Mood normal.     Examination of the right knee demonstrates:     Physical examination the patient is alert and oriented x3, well-developed, well-nourished, no acute distress.     Full ROM, full strength.     Incisional sites are clean dry intact without signs of active pathology.  Calf is soft and nontender to palpation.    The contralateral knee is without limitation in range of motion or strength, no  positive provocative maneuvers.       IMPRESSION: Zachariah Rivera is a 64 year old male who presents almost 3 months s/p Right knee scope, medial meniscus root repair on 10/15/2024.     PLAN:   We had a lengthy discussion with the patient regarding the patient's findings consistent with the expected postoperative course. We recommend continuation of physical therapy with rehabilitation efforts focused on strengthening, range of motion, functional ability, and return to baseline activity. The patient can continue to progress per protocol.    All questions were answered appropriately and the patient was in agreement with the treatment plan.       FOLLOW-UP:  3 months, no imaging needed.             Kamranr AMY Lock [facility], PA-C Orthopedic Surgery / Sports Medicine Specialist  [facility]  [address] [place] [address].org  [email or web address]  t: [phone]  o: [phone]  f: [phone]    This note was dictated using Dragon software.  While it was briefly proofread prior to completion, some grammatical, spelling, and word choice errors due to dictation may still occur.

## 2025-01-13 NOTE — TELEPHONE ENCOUNTER
Future Appointments   Date Time Provider Department Center   2/13/2025  8:30 AM Drew Horne MD [facility] [address] [facility]   4/14/2025  8:10 AM [facility] Lock [email or web address]     Orders to edward     Pt informed that labs need to be completed no sooner than 2 weeks prior to the appt. Pt aware to fast-no call back required

## 2025-02-10 ENCOUNTER — LAB ENCOUNTER (OUTPATIENT)
Dept: LAB | Facility: HOSPITAL | Age: 65
End: 2025-02-10
Attending: FAMILY MEDICINE
Payer: COMMERCIAL

## 2025-02-10 DIAGNOSIS — I10 ESSENTIAL HYPERTENSION: ICD-10-CM

## 2025-02-10 DIAGNOSIS — Z00.00 ROUTINE GENERAL MEDICAL EXAMINATION AT A HEALTH CARE FACILITY: ICD-10-CM

## 2025-02-10 DIAGNOSIS — Z12.5 SCREENING FOR MALIGNANT NEOPLASM OF PROSTATE: ICD-10-CM

## 2025-02-10 LAB
ALBUMIN SERPL-MCNC: 4.4 G/DL (ref 3.2–4.8)
ALBUMIN/GLOB SERPL: 1.6 {RATIO} (ref 1–2)
ALP LIVER SERPL-CCNC: 53 U/L
ALT SERPL-CCNC: 26 U/L
ANION GAP SERPL CALC-SCNC: 8 MMOL/L (ref 0–18)
AST SERPL-CCNC: 20 U/L (ref ?–34)
BASOPHILS # BLD AUTO: 0.02 X10(3) UL (ref 0–0.2)
BASOPHILS NFR BLD AUTO: 0.4 %
BILIRUB SERPL-MCNC: 0.9 MG/DL (ref 0.2–1.1)
BUN BLD-MCNC: 14 MG/DL (ref 9–23)
CALCIUM BLD-MCNC: 9.3 MG/DL (ref 8.7–10.6)
CHLORIDE SERPL-SCNC: 103 MMOL/L (ref 98–112)
CHOLEST SERPL-MCNC: 168 MG/DL (ref ?–200)
CO2 SERPL-SCNC: 30 MMOL/L (ref 21–32)
COMPLEXED PSA SERPL-MCNC: 3.84 NG/ML (ref ?–4)
CREAT BLD-MCNC: 1.06 MG/DL
EGFRCR SERPLBLD CKD-EPI 2021: 79 ML/MIN/1.73M2 (ref 60–?)
EOSINOPHIL # BLD AUTO: 0.18 X10(3) UL (ref 0–0.7)
EOSINOPHIL NFR BLD AUTO: 3.7 %
ERYTHROCYTE [DISTWIDTH] IN BLOOD BY AUTOMATED COUNT: 13.1 %
FASTING PATIENT LIPID ANSWER: YES
FASTING STATUS PATIENT QL REPORTED: YES
GLOBULIN PLAS-MCNC: 2.7 G/DL (ref 2–3.5)
GLUCOSE BLD-MCNC: 107 MG/DL (ref 70–99)
HCT VFR BLD AUTO: 44.7 %
HDLC SERPL-MCNC: 51 MG/DL (ref 40–59)
HGB BLD-MCNC: 15 G/DL
IMM GRANULOCYTES # BLD AUTO: 0.01 X10(3) UL (ref 0–1)
IMM GRANULOCYTES NFR BLD: 0.2 %
LDLC SERPL CALC-MCNC: 99 MG/DL (ref ?–100)
LYMPHOCYTES # BLD AUTO: 1.29 X10(3) UL (ref 1–4)
LYMPHOCYTES NFR BLD AUTO: 26.3 %
MCH RBC QN AUTO: 29.3 PG (ref 26–34)
MCHC RBC AUTO-ENTMCNC: 33.6 G/DL (ref 31–37)
MCV RBC AUTO: 87.3 FL
MONOCYTES # BLD AUTO: 0.5 X10(3) UL (ref 0.1–1)
MONOCYTES NFR BLD AUTO: 10.2 %
NEUTROPHILS # BLD AUTO: 2.9 X10 (3) UL (ref 1.5–7.7)
NEUTROPHILS # BLD AUTO: 2.9 X10(3) UL (ref 1.5–7.7)
NEUTROPHILS NFR BLD AUTO: 59.2 %
NONHDLC SERPL-MCNC: 117 MG/DL (ref ?–130)
OSMOLALITY SERPL CALC.SUM OF ELEC: 293 MOSM/KG (ref 275–295)
PLATELET # BLD AUTO: 225 10(3)UL (ref 150–450)
POTASSIUM SERPL-SCNC: 4 MMOL/L (ref 3.5–5.1)
PROT SERPL-MCNC: 7.1 G/DL (ref 5.7–8.2)
RBC # BLD AUTO: 5.12 X10(6)UL
SODIUM SERPL-SCNC: 141 MMOL/L (ref 136–145)
T4 FREE SERPL-MCNC: 1.3 NG/DL (ref 0.8–1.7)
TRIGL SERPL-MCNC: 97 MG/DL (ref 30–149)
TSI SER-ACNC: 6.2 UIU/ML (ref 0.55–4.78)
VLDLC SERPL CALC-MCNC: 16 MG/DL (ref 0–30)
WBC # BLD AUTO: 4.9 X10(3) UL (ref 4–11)

## 2025-02-10 PROCEDURE — 84439 ASSAY OF FREE THYROXINE: CPT

## 2025-02-10 PROCEDURE — 80061 LIPID PANEL: CPT

## 2025-02-10 PROCEDURE — 36415 COLL VENOUS BLD VENIPUNCTURE: CPT

## 2025-02-10 PROCEDURE — 80053 COMPREHEN METABOLIC PANEL: CPT

## 2025-02-10 PROCEDURE — 84443 ASSAY THYROID STIM HORMONE: CPT

## 2025-02-10 PROCEDURE — 85025 COMPLETE CBC W/AUTO DIFF WBC: CPT

## 2025-02-13 ENCOUNTER — OFFICE VISIT (OUTPATIENT)
Dept: INTERNAL MEDICINE CLINIC | Facility: CLINIC | Age: 65
End: 2025-02-13
Payer: COMMERCIAL

## 2025-02-13 VITALS
BODY MASS INDEX: 30.75 KG/M2 | TEMPERATURE: 97 F | WEIGHT: 232 LBS | HEIGHT: 73 IN | OXYGEN SATURATION: 98 % | HEART RATE: 78 BPM | SYSTOLIC BLOOD PRESSURE: 124 MMHG | DIASTOLIC BLOOD PRESSURE: 70 MMHG

## 2025-02-13 DIAGNOSIS — R94.6 ABNORMAL THYROID FUNCTION TEST: ICD-10-CM

## 2025-02-13 DIAGNOSIS — I10 ESSENTIAL HYPERTENSION: ICD-10-CM

## 2025-02-13 DIAGNOSIS — Z00.00 WELLNESS EXAMINATION: Primary | ICD-10-CM

## 2025-02-13 DIAGNOSIS — E78.00 HYPERCHOLESTEREMIA: ICD-10-CM

## 2025-02-13 DIAGNOSIS — Z98.890 S/P MEDIAL MENISCAL REPAIR: ICD-10-CM

## 2025-02-13 DIAGNOSIS — R97.20 ELEVATED PSA: ICD-10-CM

## 2025-02-13 PROCEDURE — 90471 IMMUNIZATION ADMIN: CPT | Performed by: FAMILY MEDICINE

## 2025-02-13 PROCEDURE — 90677 PCV20 VACCINE IM: CPT | Performed by: FAMILY MEDICINE

## 2025-02-13 PROCEDURE — 99396 PREV VISIT EST AGE 40-64: CPT | Performed by: FAMILY MEDICINE

## 2025-02-13 NOTE — PROGRESS NOTES
Zachariah Rivera  9/18/1960    Chief Complaint   Patient presents with    Physical     Lab results  Reviewed Preventative/Wellness form with patient.         HPI:   Zachariah Rivera is a 64 year old male who presents for follow-up. Has has been doing well in PT with improving strength and ROM. No new concerns today.     Current Outpatient Medications   Medication Sig Dispense Refill    atorvastatin 20 MG Oral Tab Take 1 tablet (20 mg total) by mouth nightly. 90 tablet 1    amLODIPine 5 MG Oral Tab Take 1 tablet (5 mg total) by mouth daily. 90 tablet 3    Sildenafil Citrate 100 MG Oral Tab Take 1 tablet (100 mg total) by mouth daily as needed for Erectile Dysfunction. 30 tablet 3    Fluticasone Propionate (FLONASE) 50 MCG/ACT Nasal Suspension 2 sprays by Each Nare route daily. 1 Bottle 0    Multiple Vitamins-Minerals (MULTIVITAMIN OR) Take  by mouth.        Allergies[1]   Past Medical History:    Calculus of kidney    Enlarged lymph node    Essential hypertension    Hearing impaired person, bilateral    bilateral hearing aids    High cholesterol    Sarcoidosis    Visual impairment    Glasses      Patient Active Problem List   Diagnosis    Change in bowel habits    Screening for colon cancer    Sarcoidosis    Chronic headaches    Rotator cuff syndrome of right shoulder    Localized osteoarthritis of right shoulder    Bilateral hearing loss    Special screening for malignant neoplasm of colon    Essential hypertension    Hypercholesteremia      Past Surgical History:   Procedure Laterality Date    Colonoscopy      Tonsillectomy        Family History   Problem Relation Age of Onset    Cancer Father         Lung    Lipids Mother     Heart Disorder Paternal Grandmother       Social History     Socioeconomic History    Marital status:    Tobacco Use    Smoking status: Never    Smokeless tobacco: Never   Vaping Use    Vaping status: Never Used   Substance and Sexual Activity    Alcohol use: Yes      Alcohol/week: 1.0 standard drink of alcohol     Types: 1 Cans of beer per week     Comment: Occasional    Drug use: No         REVIEW OF SYSTEMS:   GENERAL: feels well otherwise  SKIN: no rashes  EYES: no new vision changes  HEENT: not congested  LUNGS: no new dyspnea  CARDIOVASCULAR: no new chest pain  GI: no new abdominal pain  NEURO: no headaches    EXAM:   /70 (BP Location: Right arm, Patient Position: Sitting, Cuff Size: large)   Pulse 78   Temp 97 °F (36.1 °C) (Temporal)   Ht 6' 1\" (1.854 m)   Wt 232 lb (105.2 kg)   SpO2 98%   BMI 30.61 kg/m²   GENERAL: Well developed, well nourished,in no apparent distress  SKIN: No rashes,no suspicious lesions  EYES: PERRLA, EOMI, conjunctiva are clear  HEENT: atraumatic, normocephalic,ears and throat are clear  NECK: supple,no adenopathy,no bruits  LUNGS: clear to auscultation  CARDIO: RRR without murmur  GI: good BS's,no masses, HSM or tenderness    ASSESSMENT AND PLAN:   Zachariah Rivera is a 64 year old male who presents for CPE    Wellness examination  Discussed age appropriate health and wellness. Encouraged continued emphasis on healthy low processed food diet, exercise goals, restorative sleep and stress mitigation.     Essential hypertension  Controlled on current treatment.     Hypercholesteremia  Continue atorvastatin, LDL < 100.     S/P medial meniscal repair  Doing well post-operatively and progressing in PT    Abnormal thyroid function test  Elevated TSH with normal T4. Repeat TSH at next blood draw to trend/   - TSH W Reflex To Free T4 [E]; Future    Elevated PSA  Following with urology    All questions were answered and the patient agrees with the plan.     Thank you,  Drew Horne MD         [1]   Allergies  Allergen Reactions    Pcn [Penicillins] UNKNOWN     Unsure of reaction, per patient's mother; no reaction to Cefazolin 10/15/24

## 2025-04-04 ENCOUNTER — PATIENT MESSAGE (OUTPATIENT)
Dept: ORTHOPEDICS CLINIC | Facility: CLINIC | Age: 65
End: 2025-04-04

## 2025-04-08 ENCOUNTER — OFFICE VISIT (OUTPATIENT)
Dept: ORTHOPEDICS CLINIC | Facility: CLINIC | Age: 65
End: 2025-04-08
Payer: COMMERCIAL

## 2025-04-08 DIAGNOSIS — M25.461 EFFUSION OF RIGHT KNEE: Primary | ICD-10-CM

## 2025-04-08 DIAGNOSIS — Z98.890 S/P ARTHROSCOPY OF KNEE: ICD-10-CM

## 2025-04-08 PROCEDURE — 99213 OFFICE O/P EST LOW 20 MIN: CPT | Performed by: PHYSICIAN ASSISTANT

## 2025-04-08 PROCEDURE — 20610 DRAIN/INJ JOINT/BURSA W/O US: CPT | Performed by: PHYSICIAN ASSISTANT

## 2025-04-08 RX ORDER — KETOROLAC TROMETHAMINE 30 MG/ML
30 INJECTION, SOLUTION INTRAMUSCULAR; INTRAVENOUS ONCE
Status: COMPLETED | OUTPATIENT
Start: 2025-04-08 | End: 2025-04-08

## 2025-04-08 RX ORDER — TRIAMCINOLONE ACETONIDE 40 MG/ML
40 INJECTION, SUSPENSION INTRA-ARTICULAR; INTRAMUSCULAR ONCE
Status: COMPLETED | OUTPATIENT
Start: 2025-04-08 | End: 2025-04-08

## 2025-04-08 RX ADMIN — KETOROLAC TROMETHAMINE 30 MG: 30 INJECTION, SOLUTION INTRAMUSCULAR; INTRAVENOUS at 13:16:00

## 2025-04-08 RX ADMIN — TRIAMCINOLONE ACETONIDE 40 MG: 40 INJECTION, SUSPENSION INTRA-ARTICULAR; INTRAMUSCULAR at 13:16:00

## 2025-04-08 NOTE — PROGRESS NOTES
[facility]  [address] [place] [address]  [phone]       Name: Zachariah Rivera   MRN: [id number]  Date: 4/8/2025     REASON FOR VISIT: Fourth Post-Surgical Visit   Surgery: Right knee scope, medial meniscus root repair on 10/15/2024.        INTERVAL HISTORY:  Zachariah Rivera is a 64 year old male who returns after the aforementioned procedure.  The post-operative course has been unremarkable with pain well controlled and overall progress noted.     The patient complains of swelling over the past 2 weeks without a clear injury or mechanism of trauma. He feels limited in ROM.   4/10 pain.     ROS: ROS    PE:   There were no vitals filed for this visit.  Estimated body mass index is 30.61 kg/m² as calculated from the following:    Height as of 2/13/25: 6' 1\" (1.854 m).    Weight as of 2/13/25: 232 lb (105.2 kg).    Physical Exam  Constitutional:       Appearance: Normal appearance.   HENT:      Head: Normocephalic and atraumatic.   Eyes:      Extraocular Movements: Extraocular movements intact.   Neck:      Musculoskeletal: Normal range of motion and neck supple.   Cardiovascular:      Pulses: Normal pulses.   Pulmonary:      Effort: Pulmonary effort is normal. No respiratory distress.   Abdominal:      General: There is no distension.   Skin:     General: Skin is warm.      Capillary Refill: Capillary refill takes less than 2 seconds.      Findings: No bruising.   Neurological:      General: No focal deficit present.      Mental Status: She is alert.   Psychiatric:         Mood and Affect: Mood normal.     Examination of the right knee demonstrates:     Physical examination the patient is alert and oriented x3, well-developed, well-nourished, no acute distress.     100 of flexion, full extension. Large effusion. No TTP.     Incisional sites are clean dry intact without signs of active pathology.  Calf is soft and nontender to palpation.    The  contralateral knee is without limitation in range of motion or strength, no positive provocative maneuvers.       IMPRESSION: Zachariah Rivera is a 64 year old male who presents 6 months s/p  Right knee scope, medial meniscus root repair on 10/15/2024 with persistent effusion.     PLAN:   We had a lengthy discussion with the patient regarding the patient's findings consistent with the expected postoperative course. We recommend continuation of physical therapy with rehabilitation efforts focused on strengthening, range of motion, functional ability, and return to baseline activity. The patient can continue to progress per protocol.    After a discussion of a variety of conservative treatment options we elected to proceed with the injection procedure at today's visit. We discussed the risk and benefits of the procedure, including, but not limited to: infection, injury to blood vessels, nerve injury, prolonged pain, swelling, site soreness, failure to progress, and need for advanced treatments.  The patient voiced understanding and agreed to proceed with the treatment plan.     Fluid sent for analysis.   All questions were answered appropriately and the patient was in agreement with the treatment plan.       FOLLOW-UP:  Pending fluid analysis.             Laurie Lock [facility], PA-C Orthopedic Surgery / Sports Medicine Specialist  [facility]  [address] [place] [address] [id number] [place] [email or web address]  [email or web address]  t: [phone]  o: [phone]  f: [phone]    This note was dictated using Dragon software.  While it was briefly proofread prior to completion, some grammatical, spelling, and word choice errors due to dictation may still occur.

## 2025-04-08 NOTE — PROCEDURES
Right Knee Intra-articular Injection & Aspiration     Name: Zachariah Rivera   MRN: [id number]  Date: 4/8/2025     Clinical Indications:   Left knee effusion     After informed consent, the injection site was marked, sterilized with topical chlorhexidine antiseptic, and locally anesthetized with skin refrigerant.    The patient was situation in a comfortable position. Using sterile technique: 1 mL of 30mg/mL of Ketorolac, 2 mL of 0.5% Bupivicaine, 2 mL of 1% Lidocaine, and 1 mL of 40 mg/ml Triamcinolone was injected utilizing anterolateral approach with a 18 gauge needle after aspirating 80ml of serous fluid.  A band-aid was applied.  The patient tolerated the procedure well.                Kamranr AMY Lock, [facility], PA-C Orthopedic Surgery / Sports Medicine Specialist  [facility]  [address] [place] [address].org  [email or web address]  t: [phone]  o: [phone]  f: [phone]    This note was dictated using Dragon software.  While it was briefly proofread prior to completion, some grammatical, spelling, and word choice errors due to dictation may still occur.

## 2025-04-26 ENCOUNTER — LAB ENCOUNTER (OUTPATIENT)
Dept: LAB | Facility: HOSPITAL | Age: 65
End: 2025-04-26
Attending: FAMILY MEDICINE
Payer: COMMERCIAL

## 2025-04-26 DIAGNOSIS — R94.6 ABNORMAL THYROID FUNCTION TEST: ICD-10-CM

## 2025-04-26 LAB
T4 FREE SERPL-MCNC: 1.3 NG/DL (ref 0.8–1.7)
TSI SER-ACNC: 4.88 UIU/ML (ref 0.55–4.78)

## 2025-04-26 PROCEDURE — 84439 ASSAY OF FREE THYROXINE: CPT

## 2025-04-26 PROCEDURE — 84443 ASSAY THYROID STIM HORMONE: CPT

## 2025-04-26 PROCEDURE — 36415 COLL VENOUS BLD VENIPUNCTURE: CPT

## 2025-05-02 ENCOUNTER — OFFICE VISIT (OUTPATIENT)
Dept: ORTHOPEDICS CLINIC | Facility: CLINIC | Age: 65
End: 2025-05-02
Payer: COMMERCIAL

## 2025-05-02 DIAGNOSIS — M25.461 EFFUSION OF RIGHT KNEE: Primary | ICD-10-CM

## 2025-05-02 DIAGNOSIS — M94.261 CHONDROMALACIA OF RIGHT KNEE: ICD-10-CM

## 2025-05-02 DIAGNOSIS — Z98.890 S/P ARTHROSCOPY OF KNEE: ICD-10-CM

## 2025-05-02 PROCEDURE — 20610 DRAIN/INJ JOINT/BURSA W/O US: CPT | Performed by: ORTHOPAEDIC SURGERY

## 2025-05-02 PROCEDURE — 89050 BODY FLUID CELL COUNT: CPT | Performed by: ORTHOPAEDIC SURGERY

## 2025-05-02 PROCEDURE — 89060 EXAM SYNOVIAL FLUID CRYSTALS: CPT | Performed by: ORTHOPAEDIC SURGERY

## 2025-05-02 PROCEDURE — 99214 OFFICE O/P EST MOD 30 MIN: CPT | Performed by: ORTHOPAEDIC SURGERY

## 2025-05-02 PROCEDURE — 87075 CULTR BACTERIA EXCEPT BLOOD: CPT | Performed by: PHYSICIAN ASSISTANT

## 2025-05-02 PROCEDURE — 87205 SMEAR GRAM STAIN: CPT | Performed by: PHYSICIAN ASSISTANT

## 2025-05-02 PROCEDURE — 87070 CULTURE OTHR SPECIMN AEROBIC: CPT | Performed by: PHYSICIAN ASSISTANT

## 2025-05-02 NOTE — PROCEDURES
Right Knee Intra-articular Aspiration    Name: Zachariah Rivera   MRN: [id number]  Date: 5/2/2025     Clinical Indications:   Chondral lesion with symptoms refractory to conservative measures.     After informed consent, the injection site was marked, sterilized with topical chlorhexidine antiseptic, and locally anesthetized with skin refrigerant.    The patient was situation in a comfortable position. Using sterile technique: 40 ml of serous fluid aspirated with 18 gauge syringe.  A band-aid was applied.  The patient tolerated the procedure well.        Rhona Buenrostro MD  Knee, Shoulder, & Elbow Surgery / Sports Medicine Specialist  Orthopaedic Surgery  [address] [place] [address].org  [email or web address]  t: [phone]  o: [phone]  f: [phone]

## 2025-05-02 NOTE — PROGRESS NOTES
[facility]  [address] [place] [address]  [phone]       Name: Zachariah Rivera   MRN: [id number]  Date: 5/2/2025     REASON FOR VISIT: Fifth Post-Surgical Visit   Surgery: Right knee arthroscopy Medial Meniscus Root Repair Synovectomy Microfracture of Notch, medial meniscus root repair on 10/15/2024.     History of Present Illness  Zachariah Rivera is a 64 year old male who presents with right knee swelling post-operatively.    He underwent aspiration with Laurie Lock on 4/8 on his right knee and developed significant swelling approximately one week later, which has persisted for about three weeks. The swelling is described as 'pretty big'.    He experiences pain in the knee primarily when ascending or descending stairs, and occasionally feels a twitch that causes slight pain. The knee cracks frequently, but his current pain level is one out of ten.    An aspiration was performed on April 8, 2025, to evaluate the swelling, but the lab results were not processed. He has been engaging in physical therapy and is able to walk two to four miles. Despite this, the swelling has not subsided.    During a previous visit, he was questioned about his diet and alcohol consumption in relation to gout, but he maintains a balanced diet and does not consume alcohol excessively.        PE:   There were no vitals filed for this visit.  Estimated body mass index is 30.61 kg/m² as calculated from the following:    Height as of 2/13/25: 6' 1\" (1.854 m).    Weight as of 2/13/25: 232 lb.    Physical Exam  Constitutional:       Appearance: Normal appearance.   HENT:      Head: Normocephalic and atraumatic.   Eyes:      Extraocular Movements: Extraocular movements intact.   Neck:      Musculoskeletal: Normal range of motion and neck supple.   Cardiovascular:      Pulses: Normal pulses.   Pulmonary:      Effort: Pulmonary effort is normal. No respiratory distress.   Abdominal:       General: There is no distension.   Skin:     General: Skin is warm.      Capillary Refill: Capillary refill takes less than 2 seconds.      Findings: No bruising.   Neurological:      General: No focal deficit present.      Mental Status: She is alert.   Psychiatric:         Mood and Affect: Mood normal.     Examination of the right knee demonstrates:     Physical examination the patient is alert and oriented x3, well-developed, well-nourished, no acute distress.     100 of flexion, full extension. Large effusion. No TTP.     Incisional sites are clean dry intact without signs of active pathology.  Calf is soft and nontender to palpation.    The contralateral knee is without limitation in range of motion or strength, no positive provocative maneuvers.       IMPRESSION: Zachariah Rivera is a 64 year old male who presents 7 months s/p  Right knee scope, medial meniscus root repair on 10/15/2024 with persistent effusion.     Underwent repeat knee aspiration today with sending of fluid to analysis to rule out gout.     Assessment & Plan  Right medial meniscus root tear  Post-operative swelling persists post-meniscus repair, with occasional activity-related pain. Differential includes gout; previous aspiration results unavailable. Swelling possibly due to arthritis noted during surgery. Compliant with physical therapy, no dietary gout triggers reported.  - Perform knee aspiration for fluid analysis to rule out gout and other causes.  - Send aspirated fluid for laboratory analysis.        FOLLOW-UP:  Pending fluid analysis.     Rhona Buenrostro MD  Knee, Shoulder, & Elbow Surgery / Sports Medicine Specialist  Orthopaedic Surgery  [address] [place] [address]   [email or web address]  [email or web address]  t: [phone]  o: [phone]  f: [phone]     The patient verbally consented to be recorded using ambient AI listening technology and understand that they can each withdraw their consent  to this listening technology at any point by asking the clinician to turn off or pause the recording:

## 2025-05-03 LAB
BASOPHILS NFR SNV: 0 %
COLOR FLD: YELLOW
CRYSTALS SNV QL MICRO: NEGATIVE
EOSINOPHIL NFR SNV: 0 %
GRANULOCYTES # SNV AUTO: 704 /MM3 (ref 0–200)
LYMPHOCYTES NFR SNV: 50 %
MONOS+MACROS NFR SNV: 46 %
NEUTROPHILS NFR SNV: 4 %
RBC # FLD AUTO: <3000 /MM3 (ref ?–1)
TOTAL CELLS COUNTED FLD: 100
TURBIDITY CSF QL: CLEAR

## 2025-07-07 DIAGNOSIS — E78.00 HYPERCHOLESTEREMIA: ICD-10-CM

## 2025-07-10 RX ORDER — ATORVASTATIN CALCIUM 20 MG/1
20 TABLET, FILM COATED ORAL NIGHTLY
Qty: 90 TABLET | Refills: 1 | Status: SHIPPED | OUTPATIENT
Start: 2025-07-10

## 2025-07-20 DIAGNOSIS — I10 ESSENTIAL HYPERTENSION: ICD-10-CM

## 2025-07-23 RX ORDER — AMLODIPINE BESYLATE 5 MG/1
5 TABLET ORAL DAILY
Qty: 90 TABLET | Refills: 3 | Status: SHIPPED | OUTPATIENT
Start: 2025-07-23

## (undated) DEVICE — SOLUTION RUBBING 4OZ 70% ISO ALC CLR

## (undated) DEVICE — 3M™ TEGADERM™ +PAD FILM DRESSING WITH NON-ADHERENT PAD, 3584, 2-3/8 IN X 4 IN (6 CM X 10 CM), 50/CAR, 4 CAR/CS: Brand: 3M™ TEGADERM™

## (undated) DEVICE — GLOVE SUR 7.5 SENSICARE PI PIP GRN PWD F

## (undated) DEVICE — SLEEVE COMPR MD KNEE LEN SGL USE KENDALL SCD

## (undated) DEVICE — 3M™ STERI-STRIP™ REINFORCED ADHESIVE SKIN CLOSURES, R1548, 1 IN X 5 IN (25 MM X 125 MM), 4 STRIPS/ENVELOPE: Brand: 3M™ STERI-STRIP™

## (undated) DEVICE — WAND ARTHSCP 3.75MM TIP 5.25MM 90DEG

## (undated) DEVICE — SUT MCRYL 3-0 27IN ABSRB UD 19MM PS-2 3/8

## (undated) DEVICE — ADHESIVE SKIN TOP FOR WND CLSR DERMBND ADV

## (undated) DEVICE — SOLUTION IRRIG 3000ML 0.9% NACL FLX CONT

## (undated) DEVICE — 3M™ TEGADERM™ +PAD FILM DRESSING WITH NON-ADHERENT PAD, 3587, 3-1/2 IN X 4-1/8 IN (9 CM X 10.5 CM), 25/CAR, 4 CAR/CS: Brand: 3M™ TEGADERM™

## (undated) DEVICE — COVER LT HNDL PLAS RIG 2 PER PK

## (undated) DEVICE — TUBING PMP L16FT DISP INFLOW

## (undated) DEVICE — DRAPE STERI 1000 UTIL AD

## (undated) DEVICE — Device

## (undated) DEVICE — SHEET,DRAPE,40X58,STERILE: Brand: MEDLINE

## (undated) DEVICE — #15 STERILE STAINLESS BLADE: Brand: STERILE STAINLESS BLADES

## (undated) DEVICE — MEDI-VAC NON-CONDUCTIVE SUCTION TUBING: Brand: CARDINAL HEALTH

## (undated) DEVICE — GLOVE SUR 7.5 SENSICARE PI PIP CRM PWD F

## (undated) DEVICE — COVER,MAYO STAND,STERILE: Brand: MEDLINE

## (undated) DEVICE — AGGRESSIVE PLUS, ANGLED CUTTER: Brand: FORMULA

## (undated) DEVICE — KNEE ARTHROSCOPY CDS: Brand: MEDLINE INDUSTRIES, INC.

## (undated) DEVICE — SPONGE 4X4 10PK

## (undated) DEVICE — STANDARD HYPODERMIC NEEDLE,POLYPROPYLENE HUB: Brand: MONOJECT

## (undated) NOTE — LETTER
OUTSIDE TESTING RESULT REQUEST     IMPORTANT: FOR YOUR IMMEDIATE ATTENTION  Please FAX all test results listed below to: [phone]     Testing already done on or about: 24    * * * * If testing is NOT complete, arrange with patient A.S.A.P. * * * *      Patient Name: Zachariah Rivera  Surgery Date: 10/15/2024  Medical Record: [id number]  CSN: [id number]  : 1960 - A: 64 y     Sex: male  Surgeon(s):  Rhona Buenrostro MD  Procedure: Right Knee Arthroscopy Medial Meniscus Root Repair Synovectomy Microfracture of Notch  Anesthesia Type: General     Surgeon: Rhona Buenrostro MD     The following Testing and Time Line are REQUIRED PER ANESTHESIA     BMP (requires 4 hour fast) within  90 days      Hello,    Per patient's spouse, had a pre-op appointment with you today.    Thank You,   Sent by:Nica

## (undated) NOTE — LETTER
24      Orthopedic Surgery   Pre-Operative Clearance Request    Patient Name:   Zachariah Rivera             :   1960    Surgeon: Dr. Buenrostro             Date of Surgery: 10/15/2024    Surgical Procedure: Right Knee Arthroscopy Medial Meniscus Root Repair Synovectomy Microfracture of Notch       MUST COMPLETE ALL OF THE FOLLOWING 2-3 WEEKS PRIOR TO YOUR SURGERY TO AVOID CANCELLATION, DUE TO THE RULE THEIR WILL BE NO EXCEPTIONS!      [x]  History and Physical        [x]  Medical  Clearance                                  **Please fax test results, H&P, and clearance to [phone] and to P.A.T at [phone]**